# Patient Record
Sex: MALE | NOT HISPANIC OR LATINO | Employment: UNEMPLOYED | URBAN - METROPOLITAN AREA
[De-identification: names, ages, dates, MRNs, and addresses within clinical notes are randomized per-mention and may not be internally consistent; named-entity substitution may affect disease eponyms.]

---

## 2022-07-29 ENCOUNTER — HOSPITAL ENCOUNTER (EMERGENCY)
Facility: HOSPITAL | Age: 62
Discharge: HOME | End: 2022-07-29
Attending: EMERGENCY MEDICINE | Admitting: EMERGENCY MEDICINE

## 2022-07-29 VITALS
TEMPERATURE: 98.4 F | SYSTOLIC BLOOD PRESSURE: 148 MMHG | HEART RATE: 93 BPM | DIASTOLIC BLOOD PRESSURE: 96 MMHG | OXYGEN SATURATION: 100 % | RESPIRATION RATE: 16 BRPM

## 2022-07-29 DIAGNOSIS — R73.9 HYPERGLYCEMIA: ICD-10-CM

## 2022-07-29 DIAGNOSIS — F19.10 POLYSUBSTANCE ABUSE: ICD-10-CM

## 2022-07-29 DIAGNOSIS — T40.601A OPIATE OVERDOSE, ACCIDENTAL OR UNINTENTIONAL, INITIAL ENCOUNTER: Primary | ICD-10-CM

## 2022-07-29 LAB
ALBUMIN SERPL-MCNC: 4.5 G/DL (ref 3.5–5.2)
ALBUMIN/GLOB SERPL: 2.1 G/DL
ALP SERPL-CCNC: 81 U/L (ref 39–117)
ALT SERPL W P-5'-P-CCNC: 26 U/L (ref 1–41)
AMPHET+METHAMPHET UR QL: POSITIVE
ANION GAP SERPL CALCULATED.3IONS-SCNC: 10.3 MMOL/L (ref 5–15)
AST SERPL-CCNC: 27 U/L (ref 1–40)
BARBITURATES UR QL SCN: NEGATIVE
BASOPHILS # BLD AUTO: 0.07 10*3/MM3 (ref 0–0.2)
BASOPHILS NFR BLD AUTO: 0.6 % (ref 0–1.5)
BENZODIAZ UR QL SCN: NEGATIVE
BILIRUB SERPL-MCNC: 0.2 MG/DL (ref 0–1.2)
BILIRUB UR QL STRIP: NEGATIVE
BUN SERPL-MCNC: 23 MG/DL (ref 6–20)
BUN/CREAT SERPL: 21.7 (ref 7–25)
CALCIUM SPEC-SCNC: 8.8 MG/DL (ref 8.6–10.5)
CANNABINOIDS SERPL QL: POSITIVE
CHLORIDE SERPL-SCNC: 105 MMOL/L (ref 98–107)
CLARITY UR: CLEAR
CO2 SERPL-SCNC: 26.7 MMOL/L (ref 22–29)
COCAINE UR QL: NEGATIVE
COLOR UR: YELLOW
CREAT SERPL-MCNC: 1.06 MG/DL (ref 0.76–1.27)
DEPRECATED RDW RBC AUTO: 39.4 FL (ref 37–54)
EGFRCR SERPLBLD CKD-EPI 2021: 80.8 ML/MIN/1.73
EOSINOPHIL # BLD AUTO: 0.16 10*3/MM3 (ref 0–0.4)
EOSINOPHIL NFR BLD AUTO: 1.4 % (ref 0.3–6.2)
ERYTHROCYTE [DISTWIDTH] IN BLOOD BY AUTOMATED COUNT: 12.3 % (ref 12.3–15.4)
GLOBULIN UR ELPH-MCNC: 2.1 GM/DL
GLUCOSE BLDC GLUCOMTR-MCNC: 207 MG/DL (ref 70–130)
GLUCOSE SERPL-MCNC: 225 MG/DL (ref 65–99)
GLUCOSE UR STRIP-MCNC: ABNORMAL MG/DL
HCT VFR BLD AUTO: 41.6 % (ref 37.5–51)
HGB BLD-MCNC: 13.9 G/DL (ref 13–17.7)
HGB UR QL STRIP.AUTO: NEGATIVE
IMM GRANULOCYTES # BLD AUTO: 0.04 10*3/MM3 (ref 0–0.05)
IMM GRANULOCYTES NFR BLD AUTO: 0.3 % (ref 0–0.5)
KETONES UR QL STRIP: NEGATIVE
LEUKOCYTE ESTERASE UR QL STRIP.AUTO: NEGATIVE
LYMPHOCYTES # BLD AUTO: 1.32 10*3/MM3 (ref 0.7–3.1)
LYMPHOCYTES NFR BLD AUTO: 11.4 % (ref 19.6–45.3)
MCH RBC QN AUTO: 29.8 PG (ref 26.6–33)
MCHC RBC AUTO-ENTMCNC: 33.4 G/DL (ref 31.5–35.7)
MCV RBC AUTO: 89.1 FL (ref 79–97)
METHADONE UR QL SCN: NEGATIVE
MONOCYTES # BLD AUTO: 0.55 10*3/MM3 (ref 0.1–0.9)
MONOCYTES NFR BLD AUTO: 4.8 % (ref 5–12)
NEUTROPHILS NFR BLD AUTO: 81.5 % (ref 42.7–76)
NEUTROPHILS NFR BLD AUTO: 9.39 10*3/MM3 (ref 1.7–7)
NITRITE UR QL STRIP: NEGATIVE
NRBC BLD AUTO-RTO: 0 /100 WBC (ref 0–0.2)
OPIATES UR QL: NEGATIVE
OXYCODONE UR QL SCN: NEGATIVE
PH UR STRIP.AUTO: 6.5 [PH] (ref 5–8)
PLATELET # BLD AUTO: 380 10*3/MM3 (ref 140–450)
PMV BLD AUTO: 9.9 FL (ref 6–12)
POTASSIUM SERPL-SCNC: 3.6 MMOL/L (ref 3.5–5.2)
PROT SERPL-MCNC: 6.6 G/DL (ref 6–8.5)
PROT UR QL STRIP: NEGATIVE
QT INTERVAL: 414 MS
RBC # BLD AUTO: 4.67 10*6/MM3 (ref 4.14–5.8)
SODIUM SERPL-SCNC: 142 MMOL/L (ref 136–145)
SP GR UR STRIP: 1.02 (ref 1–1.03)
TROPONIN T SERPL-MCNC: <0.01 NG/ML (ref 0–0.03)
UROBILINOGEN UR QL STRIP: ABNORMAL
WBC NRBC COR # BLD: 11.53 10*3/MM3 (ref 3.4–10.8)

## 2022-07-29 PROCEDURE — 80307 DRUG TEST PRSMV CHEM ANLYZR: CPT | Performed by: NURSE PRACTITIONER

## 2022-07-29 PROCEDURE — 85025 COMPLETE CBC W/AUTO DIFF WBC: CPT | Performed by: NURSE PRACTITIONER

## 2022-07-29 PROCEDURE — 80053 COMPREHEN METABOLIC PANEL: CPT | Performed by: NURSE PRACTITIONER

## 2022-07-29 PROCEDURE — 96374 THER/PROPH/DIAG INJ IV PUSH: CPT

## 2022-07-29 PROCEDURE — 93005 ELECTROCARDIOGRAM TRACING: CPT | Performed by: NURSE PRACTITIONER

## 2022-07-29 PROCEDURE — 99284 EMERGENCY DEPT VISIT MOD MDM: CPT

## 2022-07-29 PROCEDURE — 81003 URINALYSIS AUTO W/O SCOPE: CPT | Performed by: NURSE PRACTITIONER

## 2022-07-29 PROCEDURE — 82962 GLUCOSE BLOOD TEST: CPT

## 2022-07-29 PROCEDURE — 25010000002 NALOXONE PER 1 MG

## 2022-07-29 PROCEDURE — 36415 COLL VENOUS BLD VENIPUNCTURE: CPT

## 2022-07-29 PROCEDURE — 84484 ASSAY OF TROPONIN QUANT: CPT | Performed by: NURSE PRACTITIONER

## 2022-07-29 PROCEDURE — 93010 ELECTROCARDIOGRAM REPORT: CPT | Performed by: INTERNAL MEDICINE

## 2022-07-29 RX ORDER — NALOXONE HYDROCHLORIDE 0.4 MG/ML
INJECTION, SOLUTION INTRAMUSCULAR; INTRAVENOUS; SUBCUTANEOUS
Status: COMPLETED
Start: 2022-07-29 | End: 2022-07-29

## 2022-07-29 RX ORDER — NALOXONE HCL 0.4 MG/ML
0.2 VIAL (ML) INJECTION ONCE
Status: COMPLETED | OUTPATIENT
Start: 2022-07-29 | End: 2022-07-29

## 2022-07-29 RX ADMIN — SODIUM CHLORIDE 1000 ML: 9 INJECTION, SOLUTION INTRAVENOUS at 17:27

## 2022-07-29 RX ADMIN — Medication 0.2 MG: at 17:10

## 2022-07-29 RX ADMIN — NALOXONE HYDROCHLORIDE 0.2 MG: 0.4 INJECTION, SOLUTION INTRAMUSCULAR; INTRAVENOUS; SUBCUTANEOUS at 17:10

## 2023-12-18 ENCOUNTER — APPOINTMENT (OUTPATIENT)
Dept: GENERAL RADIOLOGY | Facility: HOSPITAL | Age: 63
End: 2023-12-18
Payer: MEDICAID

## 2023-12-18 ENCOUNTER — HOSPITAL ENCOUNTER (INPATIENT)
Facility: HOSPITAL | Age: 63
LOS: 7 days | Discharge: HOME | End: 2023-12-26
Attending: EMERGENCY MEDICINE | Admitting: HOSPITALIST
Payer: MEDICAID

## 2023-12-18 DIAGNOSIS — M54.81 OCCIPITAL NEURALGIA, UNSPECIFIED LATERALITY: ICD-10-CM

## 2023-12-18 DIAGNOSIS — D72.829 LEUKOCYTOSIS, UNSPECIFIED TYPE: ICD-10-CM

## 2023-12-18 DIAGNOSIS — M54.2 NECK PAIN: ICD-10-CM

## 2023-12-18 DIAGNOSIS — J18.9 PNEUMONIA DUE TO INFECTIOUS ORGANISM, UNSPECIFIED LATERALITY, UNSPECIFIED PART OF LUNG: Primary | ICD-10-CM

## 2023-12-18 PROCEDURE — 83605 ASSAY OF LACTIC ACID: CPT | Performed by: EMERGENCY MEDICINE

## 2023-12-18 PROCEDURE — 85730 THROMBOPLASTIN TIME PARTIAL: CPT | Performed by: EMERGENCY MEDICINE

## 2023-12-18 PROCEDURE — 36415 COLL VENOUS BLD VENIPUNCTURE: CPT

## 2023-12-18 PROCEDURE — 87077 CULTURE AEROBIC IDENTIFY: CPT | Performed by: EMERGENCY MEDICINE

## 2023-12-18 PROCEDURE — 99285 EMERGENCY DEPT VISIT HI MDM: CPT

## 2023-12-18 PROCEDURE — 85610 PROTHROMBIN TIME: CPT | Performed by: EMERGENCY MEDICINE

## 2023-12-18 PROCEDURE — 25010000002 MORPHINE PER 10 MG: Performed by: EMERGENCY MEDICINE

## 2023-12-18 PROCEDURE — 25010000002 ONDANSETRON PER 1 MG: Performed by: EMERGENCY MEDICINE

## 2023-12-18 PROCEDURE — 85025 COMPLETE CBC W/AUTO DIFF WBC: CPT | Performed by: EMERGENCY MEDICINE

## 2023-12-18 PROCEDURE — 71045 X-RAY EXAM CHEST 1 VIEW: CPT

## 2023-12-18 PROCEDURE — 87186 SC STD MICRODIL/AGAR DIL: CPT | Performed by: EMERGENCY MEDICINE

## 2023-12-18 PROCEDURE — 87040 BLOOD CULTURE FOR BACTERIA: CPT | Performed by: EMERGENCY MEDICINE

## 2023-12-18 PROCEDURE — 84145 PROCALCITONIN (PCT): CPT | Performed by: EMERGENCY MEDICINE

## 2023-12-18 PROCEDURE — 87150 DNA/RNA AMPLIFIED PROBE: CPT | Performed by: EMERGENCY MEDICINE

## 2023-12-18 PROCEDURE — 80053 COMPREHEN METABOLIC PANEL: CPT | Performed by: EMERGENCY MEDICINE

## 2023-12-18 RX ORDER — MORPHINE SULFATE 2 MG/ML
4 INJECTION, SOLUTION INTRAMUSCULAR; INTRAVENOUS ONCE
Status: COMPLETED | OUTPATIENT
Start: 2023-12-19 | End: 2023-12-18

## 2023-12-18 RX ORDER — DEXAMETHASONE SODIUM PHOSPHATE 10 MG/ML
10 INJECTION INTRAMUSCULAR; INTRAVENOUS ONCE
Status: COMPLETED | OUTPATIENT
Start: 2023-12-19 | End: 2023-12-19

## 2023-12-18 RX ORDER — ONDANSETRON 2 MG/ML
4 INJECTION INTRAMUSCULAR; INTRAVENOUS ONCE
Status: COMPLETED | OUTPATIENT
Start: 2023-12-19 | End: 2023-12-18

## 2023-12-18 RX ORDER — SODIUM CHLORIDE 0.9 % (FLUSH) 0.9 %
10 SYRINGE (ML) INJECTION AS NEEDED
Status: DISCONTINUED | OUTPATIENT
Start: 2023-12-18 | End: 2023-12-25

## 2023-12-18 RX ADMIN — MORPHINE SULFATE 4 MG: 2 INJECTION, SOLUTION INTRAMUSCULAR; INTRAVENOUS at 23:58

## 2023-12-18 RX ADMIN — ONDANSETRON 4 MG: 2 INJECTION INTRAMUSCULAR; INTRAVENOUS at 23:58

## 2023-12-19 ENCOUNTER — APPOINTMENT (OUTPATIENT)
Dept: CT IMAGING | Facility: HOSPITAL | Age: 63
End: 2023-12-19
Payer: MEDICAID

## 2023-12-19 ENCOUNTER — APPOINTMENT (OUTPATIENT)
Dept: GENERAL RADIOLOGY | Facility: HOSPITAL | Age: 63
End: 2023-12-19
Payer: MEDICAID

## 2023-12-19 ENCOUNTER — APPOINTMENT (OUTPATIENT)
Dept: MRI IMAGING | Facility: HOSPITAL | Age: 63
End: 2023-12-19
Payer: MEDICAID

## 2023-12-19 PROBLEM — D72.829 LEUKOCYTOSIS: Status: ACTIVE | Noted: 2023-12-19

## 2023-12-19 PROBLEM — J18.9 PNEUMONIA: Status: ACTIVE | Noted: 2023-12-19

## 2023-12-19 PROBLEM — J18.9 PNEUMONIA DUE TO INFECTIOUS ORGANISM: Status: ACTIVE | Noted: 2023-12-19

## 2023-12-19 PROBLEM — M54.2 NECK PAIN: Status: ACTIVE | Noted: 2023-12-19

## 2023-12-19 LAB
ALBUMIN SERPL-MCNC: 3.7 G/DL (ref 3.5–5.2)
ALBUMIN/GLOB SERPL: 1.1 G/DL
ALP SERPL-CCNC: 131 U/L (ref 39–117)
ALT SERPL W P-5'-P-CCNC: 31 U/L (ref 1–41)
AMPHET+METHAMPHET UR QL: POSITIVE
ANION GAP SERPL CALCULATED.3IONS-SCNC: 13.8 MMOL/L (ref 5–15)
ANION GAP SERPL CALCULATED.3IONS-SCNC: 14 MMOL/L (ref 5–15)
APTT PPP: 36.7 SECONDS (ref 22.7–35.4)
AST SERPL-CCNC: 31 U/L (ref 1–40)
B PARAPERT DNA SPEC QL NAA+PROBE: NOT DETECTED
B PERT DNA SPEC QL NAA+PROBE: NOT DETECTED
BACTERIA BLD CULT: ABNORMAL
BARBITURATES UR QL SCN: NEGATIVE
BASOPHILS # BLD AUTO: 0.05 10*3/MM3 (ref 0–0.2)
BASOPHILS # BLD AUTO: 0.08 10*3/MM3 (ref 0–0.2)
BASOPHILS NFR BLD AUTO: 0.3 % (ref 0–1.5)
BASOPHILS NFR BLD AUTO: 0.4 % (ref 0–1.5)
BENZODIAZ UR QL SCN: NEGATIVE
BILIRUB SERPL-MCNC: 0.3 MG/DL (ref 0–1.2)
BOTTLE TYPE: ABNORMAL
BUN SERPL-MCNC: 14 MG/DL (ref 8–23)
BUN SERPL-MCNC: 16 MG/DL (ref 8–23)
BUN/CREAT SERPL: 17.4 (ref 7–25)
BUN/CREAT SERPL: 19.2 (ref 7–25)
C PNEUM DNA NPH QL NAA+NON-PROBE: NOT DETECTED
CALCIUM SPEC-SCNC: 7.9 MG/DL (ref 8.6–10.5)
CALCIUM SPEC-SCNC: 8.9 MG/DL (ref 8.6–10.5)
CANNABINOIDS SERPL QL: NEGATIVE
CHLORIDE SERPL-SCNC: 100 MMOL/L (ref 98–107)
CHLORIDE SERPL-SCNC: 99 MMOL/L (ref 98–107)
CO2 SERPL-SCNC: 22 MMOL/L (ref 22–29)
CO2 SERPL-SCNC: 22.2 MMOL/L (ref 22–29)
COCAINE UR QL: NEGATIVE
CREAT SERPL-MCNC: 0.73 MG/DL (ref 0.76–1.27)
CREAT SERPL-MCNC: 0.92 MG/DL (ref 0.76–1.27)
D-LACTATE SERPL-SCNC: 1.1 MMOL/L (ref 0.5–2)
D-LACTATE SERPL-SCNC: 1.2 MMOL/L (ref 0.5–2)
DEPRECATED RDW RBC AUTO: 35.4 FL (ref 37–54)
DEPRECATED RDW RBC AUTO: 35.7 FL (ref 37–54)
EGFRCR SERPLBLD CKD-EPI 2021: 104.2 ML/MIN/1.73
EGFRCR SERPLBLD CKD-EPI 2021: 95.2 ML/MIN/1.73
EOSINOPHIL # BLD AUTO: 0.01 10*3/MM3 (ref 0–0.4)
EOSINOPHIL # BLD AUTO: 0.13 10*3/MM3 (ref 0–0.4)
EOSINOPHIL NFR BLD AUTO: 0.1 % (ref 0.3–6.2)
EOSINOPHIL NFR BLD AUTO: 0.6 % (ref 0.3–6.2)
ERYTHROCYTE [DISTWIDTH] IN BLOOD BY AUTOMATED COUNT: 11.3 % (ref 12.3–15.4)
ERYTHROCYTE [DISTWIDTH] IN BLOOD BY AUTOMATED COUNT: 11.4 % (ref 12.3–15.4)
FENTANYL UR-MCNC: NEGATIVE NG/ML
FLUAV SUBTYP SPEC NAA+PROBE: NOT DETECTED
FLUBV RNA ISLT QL NAA+PROBE: NOT DETECTED
GLOBULIN UR ELPH-MCNC: 3.3 GM/DL
GLUCOSE SERPL-MCNC: 162 MG/DL (ref 65–99)
GLUCOSE SERPL-MCNC: 204 MG/DL (ref 65–99)
HADV DNA SPEC NAA+PROBE: NOT DETECTED
HCOV 229E RNA SPEC QL NAA+PROBE: NOT DETECTED
HCOV HKU1 RNA SPEC QL NAA+PROBE: NOT DETECTED
HCOV NL63 RNA SPEC QL NAA+PROBE: NOT DETECTED
HCOV OC43 RNA SPEC QL NAA+PROBE: NOT DETECTED
HCT VFR BLD AUTO: 34.1 % (ref 37.5–51)
HCT VFR BLD AUTO: 36.2 % (ref 37.5–51)
HGB BLD-MCNC: 12 G/DL (ref 13–17.7)
HGB BLD-MCNC: 12.1 G/DL (ref 13–17.7)
HMPV RNA NPH QL NAA+NON-PROBE: NOT DETECTED
HPIV1 RNA ISLT QL NAA+PROBE: NOT DETECTED
HPIV2 RNA SPEC QL NAA+PROBE: NOT DETECTED
HPIV3 RNA NPH QL NAA+PROBE: NOT DETECTED
HPIV4 P GENE NPH QL NAA+PROBE: NOT DETECTED
IMM GRANULOCYTES # BLD AUTO: 0.15 10*3/MM3 (ref 0–0.05)
IMM GRANULOCYTES # BLD AUTO: 0.21 10*3/MM3 (ref 0–0.05)
IMM GRANULOCYTES NFR BLD AUTO: 0.7 % (ref 0–0.5)
IMM GRANULOCYTES NFR BLD AUTO: 1.1 % (ref 0–0.5)
INR PPP: 1.16 (ref 0.9–1.1)
INR PPP: 1.16 (ref 0.9–1.1)
L PNEUMO1 AG UR QL IA: NEGATIVE
LYMPHOCYTES # BLD AUTO: 0.39 10*3/MM3 (ref 0.7–3.1)
LYMPHOCYTES # BLD AUTO: 1.57 10*3/MM3 (ref 0.7–3.1)
LYMPHOCYTES NFR BLD AUTO: 2 % (ref 19.6–45.3)
LYMPHOCYTES NFR BLD AUTO: 7.5 % (ref 19.6–45.3)
M PNEUMO IGG SER IA-ACNC: NOT DETECTED
MCH RBC QN AUTO: 28.8 PG (ref 26.6–33)
MCH RBC QN AUTO: 31 PG (ref 26.6–33)
MCHC RBC AUTO-ENTMCNC: 33.1 G/DL (ref 31.5–35.7)
MCHC RBC AUTO-ENTMCNC: 35.5 G/DL (ref 31.5–35.7)
MCV RBC AUTO: 87 FL (ref 79–97)
MCV RBC AUTO: 87.4 FL (ref 79–97)
METHADONE UR QL SCN: NEGATIVE
MONOCYTES # BLD AUTO: 0.59 10*3/MM3 (ref 0.1–0.9)
MONOCYTES # BLD AUTO: 1.52 10*3/MM3 (ref 0.1–0.9)
MONOCYTES NFR BLD AUTO: 3 % (ref 5–12)
MONOCYTES NFR BLD AUTO: 7.3 % (ref 5–12)
NEUTROPHILS NFR BLD AUTO: 17.51 10*3/MM3 (ref 1.7–7)
NEUTROPHILS NFR BLD AUTO: 18.73 10*3/MM3 (ref 1.7–7)
NEUTROPHILS NFR BLD AUTO: 83.5 % (ref 42.7–76)
NEUTROPHILS NFR BLD AUTO: 93.5 % (ref 42.7–76)
NRBC BLD AUTO-RTO: 0 /100 WBC (ref 0–0.2)
NRBC BLD AUTO-RTO: 0 /100 WBC (ref 0–0.2)
OPIATES UR QL: POSITIVE
OXYCODONE UR QL SCN: NEGATIVE
PLATELET # BLD AUTO: 375 10*3/MM3 (ref 140–450)
PLATELET # BLD AUTO: 398 10*3/MM3 (ref 140–450)
PMV BLD AUTO: 10 FL (ref 6–12)
PMV BLD AUTO: 10.3 FL (ref 6–12)
POTASSIUM SERPL-SCNC: 3.5 MMOL/L (ref 3.5–5.2)
POTASSIUM SERPL-SCNC: 3.6 MMOL/L (ref 3.5–5.2)
PROCALCITONIN SERPL-MCNC: 5.45 NG/ML (ref 0–0.25)
PROT SERPL-MCNC: 7 G/DL (ref 6–8.5)
PROTHROMBIN TIME: 14.9 SECONDS (ref 11.7–14.2)
PROTHROMBIN TIME: 15 SECONDS (ref 11.7–14.2)
RBC # BLD AUTO: 3.9 10*6/MM3 (ref 4.14–5.8)
RBC # BLD AUTO: 4.16 10*6/MM3 (ref 4.14–5.8)
RHINOVIRUS RNA SPEC NAA+PROBE: NOT DETECTED
RSV RNA NPH QL NAA+NON-PROBE: NOT DETECTED
S PNEUM AG SPEC QL LA: POSITIVE
SARS-COV-2 RNA NPH QL NAA+NON-PROBE: NOT DETECTED
SODIUM SERPL-SCNC: 135 MMOL/L (ref 136–145)
SODIUM SERPL-SCNC: 136 MMOL/L (ref 136–145)
WBC NRBC COR # BLD AUTO: 19.98 10*3/MM3 (ref 3.4–10.8)
WBC NRBC COR # BLD AUTO: 20.96 10*3/MM3 (ref 3.4–10.8)

## 2023-12-19 PROCEDURE — 25010000002 DEXAMETHASONE PER 1 MG: Performed by: EMERGENCY MEDICINE

## 2023-12-19 PROCEDURE — 25010000002 CEFTRIAXONE PER 250 MG: Performed by: HOSPITALIST

## 2023-12-19 PROCEDURE — 70450 CT HEAD/BRAIN W/O DYE: CPT

## 2023-12-19 PROCEDURE — 80048 BASIC METABOLIC PNL TOTAL CA: CPT | Performed by: NURSE PRACTITIONER

## 2023-12-19 PROCEDURE — 83605 ASSAY OF LACTIC ACID: CPT | Performed by: NURSE PRACTITIONER

## 2023-12-19 PROCEDURE — 80307 DRUG TEST PRSMV CHEM ANLYZR: CPT | Performed by: EMERGENCY MEDICINE

## 2023-12-19 PROCEDURE — 85025 COMPLETE CBC W/AUTO DIFF WBC: CPT | Performed by: NURSE PRACTITIONER

## 2023-12-19 PROCEDURE — A9577 INJ MULTIHANCE: HCPCS | Performed by: HOSPITALIST

## 2023-12-19 PROCEDURE — 87040 BLOOD CULTURE FOR BACTERIA: CPT | Performed by: EMERGENCY MEDICINE

## 2023-12-19 PROCEDURE — 72156 MRI NECK SPINE W/O & W/DYE: CPT

## 2023-12-19 PROCEDURE — 25810000003 SODIUM CHLORIDE 0.9 % SOLUTION: Performed by: NURSE PRACTITIONER

## 2023-12-19 PROCEDURE — 87070 CULTURE OTHR SPECIMN AEROBIC: CPT | Performed by: NURSE PRACTITIONER

## 2023-12-19 PROCEDURE — 25810000003 SODIUM CHLORIDE 0.9 % SOLUTION: Performed by: EMERGENCY MEDICINE

## 2023-12-19 PROCEDURE — 0202U NFCT DS 22 TRGT SARS-COV-2: CPT | Performed by: HOSPITALIST

## 2023-12-19 PROCEDURE — 25810000003 SODIUM CHLORIDE 0.9 % SOLUTION 250 ML FLEX CONT: Performed by: EMERGENCY MEDICINE

## 2023-12-19 PROCEDURE — 87449 NOS EACH ORGANISM AG IA: CPT | Performed by: HOSPITALIST

## 2023-12-19 PROCEDURE — 99223 1ST HOSP IP/OBS HIGH 75: CPT | Performed by: INTERNAL MEDICINE

## 2023-12-19 PROCEDURE — 0 GADOBENATE DIMEGLUMINE 529 MG/ML SOLUTION: Performed by: HOSPITALIST

## 2023-12-19 PROCEDURE — 25010000002 HYDROMORPHONE PER 4 MG: Performed by: NURSE PRACTITIONER

## 2023-12-19 PROCEDURE — 72125 CT NECK SPINE W/O DYE: CPT

## 2023-12-19 PROCEDURE — 25010000002 HYDROMORPHONE 1 MG/ML SOLUTION: Performed by: EMERGENCY MEDICINE

## 2023-12-19 PROCEDURE — 25010000002 AZITHROMYCIN PER 500 MG: Performed by: EMERGENCY MEDICINE

## 2023-12-19 PROCEDURE — 87077 CULTURE AEROBIC IDENTIFY: CPT | Performed by: EMERGENCY MEDICINE

## 2023-12-19 PROCEDURE — 87205 SMEAR GRAM STAIN: CPT | Performed by: NURSE PRACTITIONER

## 2023-12-19 PROCEDURE — 25010000002 CEFTRIAXONE PER 250 MG: Performed by: EMERGENCY MEDICINE

## 2023-12-19 PROCEDURE — 85610 PROTHROMBIN TIME: CPT | Performed by: NURSE PRACTITIONER

## 2023-12-19 PROCEDURE — 36415 COLL VENOUS BLD VENIPUNCTURE: CPT | Performed by: NURSE PRACTITIONER

## 2023-12-19 RX ORDER — ACETAMINOPHEN 650 MG/1
650 SUPPOSITORY RECTAL EVERY 4 HOURS PRN
Status: DISCONTINUED | OUTPATIENT
Start: 2023-12-19 | End: 2023-12-25

## 2023-12-19 RX ORDER — OXYCODONE HYDROCHLORIDE AND ACETAMINOPHEN 5; 325 MG/1; MG/1
1 TABLET ORAL EVERY 4 HOURS PRN
Status: DISCONTINUED | OUTPATIENT
Start: 2023-12-19 | End: 2023-12-20

## 2023-12-19 RX ORDER — CYCLOBENZAPRINE HCL 10 MG
10 TABLET ORAL 3 TIMES DAILY PRN
Status: DISCONTINUED | OUTPATIENT
Start: 2023-12-19 | End: 2023-12-25

## 2023-12-19 RX ORDER — SODIUM CHLORIDE 9 MG/ML
40 INJECTION, SOLUTION INTRAVENOUS AS NEEDED
Status: DISCONTINUED | OUTPATIENT
Start: 2023-12-19 | End: 2023-12-26 | Stop reason: HOSPADM

## 2023-12-19 RX ORDER — BISACODYL 10 MG
10 SUPPOSITORY, RECTAL RECTAL DAILY PRN
Status: DISCONTINUED | OUTPATIENT
Start: 2023-12-19 | End: 2023-12-26 | Stop reason: HOSPADM

## 2023-12-19 RX ORDER — HYDROMORPHONE HYDROCHLORIDE 1 MG/ML
0.5 INJECTION, SOLUTION INTRAMUSCULAR; INTRAVENOUS; SUBCUTANEOUS EVERY 4 HOURS PRN
Status: DISCONTINUED | OUTPATIENT
Start: 2023-12-19 | End: 2023-12-25

## 2023-12-19 RX ORDER — AMOXICILLIN 250 MG
2 CAPSULE ORAL 2 TIMES DAILY
Status: DISCONTINUED | OUTPATIENT
Start: 2023-12-19 | End: 2023-12-26 | Stop reason: HOSPADM

## 2023-12-19 RX ORDER — SODIUM CHLORIDE 9 MG/ML
100 INJECTION, SOLUTION INTRAVENOUS CONTINUOUS
Status: DISCONTINUED | OUTPATIENT
Start: 2023-12-19 | End: 2023-12-23

## 2023-12-19 RX ORDER — ACETAMINOPHEN 325 MG/1
650 TABLET ORAL EVERY 4 HOURS PRN
Status: DISCONTINUED | OUTPATIENT
Start: 2023-12-19 | End: 2023-12-26 | Stop reason: HOSPADM

## 2023-12-19 RX ORDER — SODIUM CHLORIDE 0.9 % (FLUSH) 0.9 %
10 SYRINGE (ML) INJECTION AS NEEDED
Status: DISCONTINUED | OUTPATIENT
Start: 2023-12-19 | End: 2023-12-26 | Stop reason: HOSPADM

## 2023-12-19 RX ORDER — POLYETHYLENE GLYCOL 3350 17 G/17G
17 POWDER, FOR SOLUTION ORAL DAILY PRN
Status: DISCONTINUED | OUTPATIENT
Start: 2023-12-19 | End: 2023-12-26 | Stop reason: HOSPADM

## 2023-12-19 RX ORDER — BISACODYL 5 MG/1
5 TABLET, DELAYED RELEASE ORAL DAILY PRN
Status: DISCONTINUED | OUTPATIENT
Start: 2023-12-19 | End: 2023-12-26 | Stop reason: HOSPADM

## 2023-12-19 RX ORDER — SODIUM CHLORIDE 0.9 % (FLUSH) 0.9 %
10 SYRINGE (ML) INJECTION EVERY 12 HOURS SCHEDULED
Status: DISCONTINUED | OUTPATIENT
Start: 2023-12-19 | End: 2023-12-26 | Stop reason: HOSPADM

## 2023-12-19 RX ORDER — NITROGLYCERIN 0.4 MG/1
0.4 TABLET SUBLINGUAL
Status: DISCONTINUED | OUTPATIENT
Start: 2023-12-19 | End: 2023-12-26 | Stop reason: HOSPADM

## 2023-12-19 RX ORDER — ACETAMINOPHEN 160 MG/5ML
650 SOLUTION ORAL EVERY 4 HOURS PRN
Status: DISCONTINUED | OUTPATIENT
Start: 2023-12-19 | End: 2023-12-25

## 2023-12-19 RX ORDER — ONDANSETRON 2 MG/ML
4 INJECTION INTRAMUSCULAR; INTRAVENOUS EVERY 6 HOURS PRN
Status: DISCONTINUED | OUTPATIENT
Start: 2023-12-19 | End: 2023-12-26 | Stop reason: HOSPADM

## 2023-12-19 RX ADMIN — HYDROMORPHONE HYDROCHLORIDE 1 MG: 1 INJECTION, SOLUTION INTRAMUSCULAR; INTRAVENOUS; SUBCUTANEOUS at 00:26

## 2023-12-19 RX ADMIN — OXYCODONE AND ACETAMINOPHEN 1 TABLET: 5; 325 TABLET ORAL at 21:59

## 2023-12-19 RX ADMIN — DEXAMETHASONE SODIUM PHOSPHATE 10 MG: 10 INJECTION INTRAMUSCULAR; INTRAVENOUS at 00:06

## 2023-12-19 RX ADMIN — AZITHROMYCIN MONOHYDRATE 500 MG: 500 INJECTION, POWDER, LYOPHILIZED, FOR SOLUTION INTRAVENOUS at 01:02

## 2023-12-19 RX ADMIN — SODIUM CHLORIDE 100 ML/HR: 9 INJECTION, SOLUTION INTRAVENOUS at 14:54

## 2023-12-19 RX ADMIN — HYDROMORPHONE HYDROCHLORIDE 0.5 MG: 1 INJECTION, SOLUTION INTRAMUSCULAR; INTRAVENOUS; SUBCUTANEOUS at 05:08

## 2023-12-19 RX ADMIN — CEFTRIAXONE SODIUM 1000 MG: 1 INJECTION, POWDER, FOR SOLUTION INTRAMUSCULAR; INTRAVENOUS at 00:12

## 2023-12-19 RX ADMIN — HYDROMORPHONE HYDROCHLORIDE 0.5 MG: 1 INJECTION, SOLUTION INTRAMUSCULAR; INTRAVENOUS; SUBCUTANEOUS at 18:21

## 2023-12-19 RX ADMIN — SODIUM CHLORIDE 100 ML/HR: 9 INJECTION, SOLUTION INTRAVENOUS at 04:38

## 2023-12-19 RX ADMIN — HYDROMORPHONE HYDROCHLORIDE 0.5 MG: 1 INJECTION, SOLUTION INTRAMUSCULAR; INTRAVENOUS; SUBCUTANEOUS at 09:57

## 2023-12-19 RX ADMIN — Medication 10 ML: at 22:01

## 2023-12-19 RX ADMIN — Medication 10 ML: at 08:00

## 2023-12-19 RX ADMIN — HYDROMORPHONE HYDROCHLORIDE 1 MG: 1 INJECTION, SOLUTION INTRAMUSCULAR; INTRAVENOUS; SUBCUTANEOUS at 01:46

## 2023-12-19 RX ADMIN — OXYCODONE AND ACETAMINOPHEN 1 TABLET: 5; 325 TABLET ORAL at 14:44

## 2023-12-19 RX ADMIN — GADOBENATE DIMEGLUMINE 14 ML: 529 INJECTION, SOLUTION INTRAVENOUS at 20:26

## 2023-12-19 RX ADMIN — SENNOSIDES AND DOCUSATE SODIUM 2 TABLET: 50; 8.6 TABLET ORAL at 08:20

## 2023-12-19 RX ADMIN — CEFTRIAXONE 2000 MG: 2 INJECTION, POWDER, FOR SOLUTION INTRAMUSCULAR; INTRAVENOUS at 22:00

## 2023-12-19 RX ADMIN — SODIUM CHLORIDE 1000 ML: 9 INJECTION, SOLUTION INTRAVENOUS at 01:02

## 2023-12-19 RX ADMIN — ACETAMINOPHEN 650 MG: 325 TABLET, FILM COATED ORAL at 08:20

## 2023-12-19 RX ADMIN — CYCLOBENZAPRINE 10 MG: 10 TABLET, FILM COATED ORAL at 16:02

## 2023-12-19 NOTE — PLAN OF CARE
Goal Outcome Evaluation:         Pt. Arrived to this unit around 0430 per w/c, Pt. Transferred to bed self from w/c, alert, oriented x4, admitted with pneumonia with neck pain, no coughing noted, no congested to bilateral lungs area, Pt. C/o pain so bad to neck area, [facility] called, new order noted, pain injection done as ordered around 0500, v/s stable, 02 sats 94% with 02 inhal. @2l/m per n/c, no s/s acute distress noted

## 2023-12-19 NOTE — NURSING NOTE
This patient was rude and disgruntle at triage, throwing ICC papers at this nurse twice. Pt was asked to not throw papers at anyone and remain calm to answer appropriate triage questions. Pt was in NAD.

## 2023-12-19 NOTE — PROGRESS NOTES
Referring Provider: Richard Perez MD      Subjective   History of present illness: 62-year-old with approximately 5-day history of chest congestion, pleuritic chest pain, intermittently productive cough and fever and shaking chills.  Eventually went to an urgent care center and was diagnosed with pneumonia.  He had been sleeping awkwardly and thinks he may have injured the neck.  There was concerns for potential meningitis so he is referred to the ER.  Here he was found to be tachycardic and had a white count of 21 with a procalcitonin of 5.45.  He was started on ceftriaxone and admitted.  Blood cultures now growing gram-positive cocci  UDS was also positive for opiates and amphetamines/methamphetamines.  He denies any illicit drug use.  He denies any alcohol use.  He works as a  and lives with a friend in [facility]  Physical Exam:   Vital Signs   Temp:  [97.1 °F (36.2 °C)-98.4 °F (36.9 °C)] 98 °F (36.7 °C)  Heart Rate:  [] 88  Resp:  [16-18] 16  BP: (132-175)/() 144/105    GENERAL: Awake and alert, in no acute distress.   HEENT: Oropharynx is clear. Hearing is grossly normal.   EYES: . No conjunctival injection. No lid lag.   LUNGS:normal respiratory effort.   SKIN: no cutaneous eruptions in exposed areas  PSYCHIATRIC: Appropriate mood, affect, insight, and judgment.     Results Review:  WBC 19.98, hgb 12, plt 398  Cr 0.73  PCT 5.45  LFTs nl  UDS +amp/meth, opaites      Microbiology:  12/18 Bcx 2/2 GPC in pairs  12/19 RPP neg     Radiology: CXR, independently interpreted: LLL PNA    A/p  1.  Gram-positive septicemia secondary #2  2.  Left lower lobe pneumonia    Suspect the neck pain is just musculoskeletal and has a severe bacteremic left lower lobe pneumonia (probably pneumococcus).  Agree with Rocephin 2 g IV every 24 hours.  Will repeat blood cultures tomorrow      Thank you for this consult.  We will continue to follow along and tailor antibiotics as the patient's clinical course  evolves.

## 2023-12-19 NOTE — H&P
HISTORY AND PHYSICAL   [facility]        Patient Identification:  Name: Markos Virk  Age: 62 y.o.  Sex: male  :  1960  MRN: [id number]                     Primary Care Physician: Provider, No Known    Chief Complaint: Neck pain, cough and congestion    History of Present Illness:      The patient is a 62 y.o. male who presents to the hospital complaining of acute cough and congestion for about a week.  Patient also reports neck pain and stiffness for the past 48 hours.  No confusion.  Intermittent fever and chills.  Patient was seen in urgent care earlier today told he had pneumonia in his left lung.  Patient was advised to come to the ER for further evaluation possible meningitis.  Patient reports he has headache and neck stiffness unable to rotate flex or extend his neck without discomfort.  States not had similar pain in the past.    Past Medical History:  History reviewed. No pertinent past medical history.  Past Surgical History:  History reviewed. No pertinent surgical history.   Home Meds:  No medications prior to admission.     Current meds    Current Facility-Administered Medications:     acetaminophen (TYLENOL) tablet 650 mg, 650 mg, Oral, Q4H PRN, 650 mg at 23 0820 **OR** acetaminophen (TYLENOL) 160 MG/5ML oral solution 650 mg, 650 mg, Oral, Q4H PRN **OR** acetaminophen (TYLENOL) suppository 650 mg, 650 mg, Rectal, Q4H PRN, Samina Cornejo APRN    sennosides-docusate (PERICOLACE) 8.6-50 MG per tablet 2 tablet, 2 tablet, Oral, BID, 2 tablet at 23 0820 **AND** polyethylene glycol (MIRALAX) packet 17 g, 17 g, Oral, Daily PRN **AND** bisacodyl (DULCOLAX) EC tablet 5 mg, 5 mg, Oral, Daily PRN **AND** bisacodyl (DULCOLAX) suppository 10 mg, 10 mg, Rectal, Daily PRN, Samina Cornejo APRN    cefTRIAXone (ROCEPHIN) 1,000 mg in sodium chloride 0.9 % 100 mL IVPB-VTB, 1,000 mg, Intravenous, Q24H, Samina Cornejo APRN    HYDROmorphone (DILAUDID) injection 0.5 mg,  0.5 mg, Intravenous, Q4H PRN, Clemente, Samina M, APRN, 0.5 mg at 12/19/23 0957    nitroglycerin (NITROSTAT) SL tablet 0.4 mg, 0.4 mg, Sublingual, Q5 Min PRN, Clemente, Samina M, APRN    ondansetron (ZOFRAN) injection 4 mg, 4 mg, Intravenous, Q6H PRN, Clemente, Samina M, APRN    [COMPLETED] Insert Peripheral IV, , , Once **AND** sodium chloride 0.9 % flush 10 mL, 10 mL, Intravenous, PRN, Dioni Guardado MD    sodium chloride 0.9 % flush 10 mL, 10 mL, Intravenous, Q12H, Clemente, Samina M, APRN    sodium chloride 0.9 % flush 10 mL, 10 mL, Intravenous, PRN, Clemente, Samina M, APRN    sodium chloride 0.9 % infusion 40 mL, 40 mL, Intravenous, PRN, Clemente, Samina M, APRN    sodium chloride 0.9 % infusion, 100 mL/hr, Intravenous, Continuous, Clemente, Samina M, APRN, Last Rate: 100 mL/hr at 12/19/23 0438, 100 mL/hr at 12/19/23 0438  Allergies:  No Known Allergies  Immunizations:    There is no immunization history on file for this patient.  Social History:   Social History     Social History Narrative    Not on file     Social History     Socioeconomic History    Marital status: Single   Tobacco Use    Smoking status: Former     Types: Cigarettes   Vaping Use    Vaping Use: Never used   Substance and Sexual Activity    Drug use: Not Currently       Family History:  History reviewed. No pertinent family history.     Review of Systems  See history of present illness and past medical history.  Patient denies headache, dizziness, syncope, falls, trauma, change in vision, change in hearing, change in taste, changes in weight, changes in appetite, focal weakness, numbness, or paresthesia.  Patient denies chest pain, palpitations, dyspnea, orthopnea, PND, cough, sinus pressure, rhinorrhea, epistaxis, hemoptysis, nausea, vomiting, hematemesis, diarrhea, constipation or hematochezia. Denies cold or heat intolerance, polydipsia, polyuria, polyphagia. Denies hematuria, pyuria, dysuria, hesitancy, frequency or  "urgency. Denies consumption of raw and under cooked meats foods or change in water source.    Denies missing any routine medications. Remainder of ROS is negative.    Objective:  tMax 24 hrs: Temp (24hrs), Av.8 °F (36.6 °C), Min:97.1 °F (36.2 °C), Max:98.4 °F (36.9 °C)    Vitals Ranges:   Temp:  [97.1 °F (36.2 °C)-98.4 °F (36.9 °C)] 98.4 °F (36.9 °C)  Heart Rate:  [] 77  Resp:  [16-18] 16  BP: (132-175)/() 142/87      Exam:  /87 (BP Location: Right arm, Patient Position: Lying)   Pulse 77   Temp 98.4 °F (36.9 °C) (Oral)   Resp 16   Ht 172.7 cm (68\")   Wt 69.4 kg (153 lb)   SpO2 95%   BMI 23.26 kg/m²     General Appearance:    Alert, cooperative, no distress, appears stated age   Head:    Normocephalic, without obvious abnormality, atraumatic   Eyes:    PERRL, conjunctivae/corneas clear, EOM's intact, both eyes   Ears:    Normal external ear canals, both ears   Nose:   Nares normal, septum midline, mucosa normal, no drainage    or sinus tenderness   Throat:   Lips, mucosa, and tongue normal   Neck: Diffusely tender, symmetrical, trachea midline, no adenopathy;     thyroid:  no enlargement/tenderness/nodules; no carotid    bruit or JVD   Back:     Symmetric, no curvature, ROM normal, no CVA tenderness   Lungs:   Some crackles on the left bilaterally, respirations unlabored   Chest Wall:    No tenderness or deformity    Heart:    Regular rate and rhythm, S1 and S2 normal, no murmur, rub   or gallop   Abdomen:     Soft, nontender, bowel sounds active all four quadrants,     no masses, no hepatomegaly, no splenomegaly   Extremities:   Extremities normal, atraumatic, no cyanosis or edema   Pulses:   2+ and symmetric all extremities   Skin:   Skin color, texture, turgor normal, no rashes or lesions   Lymph nodes:   Cervical, supraclavicular, and axillary nodes normal   Neurologic:   CNII-XII intact, normal strength, sensation intact throughout      .    Data Review:  Lab Results (last 72 hours) "       Procedure Component Value Units Date/Time    Respiratory Culture - Sputum, Cough [[id number]] Collected: 12/19/23 0945    Specimen: Sputum from Cough Updated: 12/19/23 1017    Lactic Acid, Plasma [[id number]]  (Normal) Collected: 12/19/23 0457    Specimen: Blood Updated: 12/19/23 0624     Lactate 1.2 mmol/L     Basic Metabolic Panel [[id number]]  (Abnormal) Collected: 12/19/23 0457    Specimen: Blood Updated: 12/19/23 0624     Glucose 204 mg/dL      BUN 14 mg/dL      Creatinine 0.73 mg/dL      Sodium 136 mmol/L      Potassium 3.6 mmol/L      Chloride 100 mmol/L      CO2 22.2 mmol/L      Calcium 7.9 mg/dL      BUN/Creatinine Ratio 19.2     Anion Gap 13.8 mmol/L      eGFR 104.2 mL/min/1.73     Narrative:      GFR Normal >60  Chronic Kidney Disease <60  Kidney Failure <15      Protime-INR [[id number]]  (Abnormal) Collected: 12/19/23 0456    Specimen: Blood Updated: 12/19/23 0618     Protime 14.9 Seconds      INR 1.16    CBC Auto Differential [[id number]]  (Abnormal) Collected: 12/19/23 0457    Specimen: Blood Updated: 12/19/23 0608     WBC 19.98 10*3/mm3      RBC 4.16 10*6/mm3      Hemoglobin 12.0 g/dL      Hematocrit 36.2 %      MCV 87.0 fL      MCH 28.8 pg      MCHC 33.1 g/dL      RDW 11.3 %      RDW-SD 35.4 fl      MPV 10.3 fL      Platelets 398 10*3/mm3      Neutrophil % 93.5 %      Lymphocyte % 2.0 %      Monocyte % 3.0 %      Eosinophil % 0.1 %      Basophil % 0.3 %      Immature Grans % 1.1 %      Neutrophils, Absolute 18.73 10*3/mm3      Lymphocytes, Absolute 0.39 10*3/mm3      Monocytes, Absolute 0.59 10*3/mm3      Eosinophils, Absolute 0.01 10*3/mm3      Basophils, Absolute 0.05 10*3/mm3      Immature Grans, Absolute 0.21 10*3/mm3      nRBC 0.0 /100 WBC     Urine Drug Screen - Urine, Clean Catch [[id number]]  (Abnormal) Collected: 12/19/23 0409    Specimen: Urine, Clean Catch Updated: 12/19/23 0508     Amphet/Methamphet, Screen Positive     Barbiturates Screen, Urine Negative     Benzodiazepine Screen, Urine  Negative     Cocaine Screen, Urine Negative     Opiate Screen Positive     THC, Screen, Urine Negative     Methadone Screen, Urine Negative     Oxycodone Screen, Urine Negative     Fentanyl, Urine Negative    Narrative:      Negative Thresholds Per Drugs Screened:    Amphetamines                 500 ng/ml  Barbiturates                 200 ng/ml  Benzodiazepines              100 ng/ml  Cocaine                      300 ng/ml  Methadone                    300 ng/ml  Opiates                      300 ng/ml  Oxycodone                    100 ng/ml  THC                           50 ng/ml  Fentanyl                       5 ng/ml      The Normal Value for all drugs tested is negative. This report includes final unconfirmed screening results to be used for medical treatment purposes only. Unconfirmed results must not be used for non-medical purposes such as employment or legal testing. Clinical consideration should be applied to any drug of abuse test, particularly when unconfirmed results are used.            Respiratory Panel PCR w/COVID-19(SARS-CoV-2) GINNY/ROLY/JOSE ANTONIO/PAD/COR/JEFFREY In-House, NP Swab in UTM/VTM, 2 HR TAT - Swab, Nasopharynx [[id number]]  (Normal) Collected: 12/19/23 0001    Specimen: Swab from Nasopharynx Updated: 12/19/23 0117     ADENOVIRUS, PCR Not Detected     Coronavirus 229E Not Detected     Coronavirus HKU1 Not Detected     Coronavirus NL63 Not Detected     Coronavirus OC43 Not Detected     COVID19 Not Detected     Human Metapneumovirus Not Detected     Human Rhinovirus/Enterovirus Not Detected     Influenza A PCR Not Detected     Influenza B PCR Not Detected     Parainfluenza Virus 1 Not Detected     Parainfluenza Virus 2 Not Detected     Parainfluenza Virus 3 Not Detected     Parainfluenza Virus 4 Not Detected     RSV, PCR Not Detected     Bordetella pertussis pcr Not Detected     Bordetella parapertussis PCR Not Detected     Chlamydophila pneumoniae PCR Not Detected     Mycoplasma pneumo by PCR Not Detected     Narrative:      In the setting of a positive respiratory panel with a viral infection PLUS a negative procalcitonin without other underlying concern for bacterial infection, consider observing off antibiotics or discontinuation of antibiotics and continue supportive care. If the respiratory panel is positive for atypical bacterial infection (Bordetella pertussis, Chlamydophila pneumoniae, or Mycoplasma pneumoniae), consider antibiotic de-escalation to target atypical bacterial infection.    Comprehensive Metabolic Panel [[id number]]  (Abnormal) Collected: 12/18/23 2356    Specimen: Blood Updated: 12/19/23 0100     Glucose 162 mg/dL      BUN 16 mg/dL      Creatinine 0.92 mg/dL      Sodium 135 mmol/L      Potassium 3.5 mmol/L      Chloride 99 mmol/L      CO2 22.0 mmol/L      Calcium 8.9 mg/dL      Total Protein 7.0 g/dL      Albumin 3.7 g/dL      ALT (SGPT) 31 U/L      AST (SGOT) 31 U/L      Alkaline Phosphatase 131 U/L      Total Bilirubin 0.3 mg/dL      Globulin 3.3 gm/dL      A/G Ratio 1.1 g/dL      BUN/Creatinine Ratio 17.4     Anion Gap 14.0 mmol/L      eGFR 95.2 mL/min/1.73     Narrative:      GFR Normal >60  Chronic Kidney Disease <60  Kidney Failure <15      Lactic Acid, Plasma [[id number]]  (Normal) Collected: 12/18/23 2356    Specimen: Blood Updated: 12/19/23 0057     Lactate 1.1 mmol/L     Procalcitonin [[id number]]  (Abnormal) Collected: 12/18/23 2356    Specimen: Blood Updated: 12/19/23 0039     Procalcitonin 5.45 ng/mL     Narrative:      As a Marker for Sepsis (Non-Neonates):    1. <0.5 ng/mL represents a low risk of severe sepsis and/or septic shock.  2. >2 ng/mL represents a high risk of severe sepsis and/or septic shock.    As a Marker for Lower Respiratory Tract Infections that require antibiotic therapy:    PCT on Admission    Antibiotic Therapy       6-12 Hrs later    >0.5                Strongly Recommended  >0.25 - <0.5        Recommended   0.1 - 0.25          Discouraged               "Remeasure/reassess PCT  <0.1                Strongly Discouraged     Remeasure/reassess PCT    As 28 day mortality risk marker: \"Change in Procalcitonin Result\" (>80% or <=80%) if Day 0 (or Day 1) and Day 4 values are available. Refer to http://[email or web address]-pct-calculator.com    Change in PCT <=80%  A decrease of PCT levels below or equal to 80% defines a positive change in PCT test result representing a higher risk for 28-day all-cause mortality of patients diagnosed with severe sepsis for septic shock.    Change in PCT >80%  A decrease of PCT levels of more than 80% defines a negative change in PCT result representing a lower risk for 28-day all-cause mortality of patients diagnosed with severe sepsis or septic shock.       Protime-INR [[id number]]  (Abnormal) Collected: 12/18/23 2356    Specimen: Blood Updated: 12/19/23 0022     Protime 15.0 Seconds      INR 1.16    aPTT [[id number]]  (Abnormal) Collected: 12/18/23 2356    Specimen: Blood Updated: 12/19/23 0022     PTT 36.7 seconds     Blood Culture - Blood, Arm, Left [[id number]] Collected: 12/19/23 0011    Specimen: Blood from Arm, Left Updated: 12/19/23 0015    CBC & Differential [[id number]]  (Abnormal) Collected: 12/18/23 2356    Specimen: Blood Updated: 12/19/23 0013    Narrative:      The following orders were created for panel order CBC & Differential.  Procedure                               Abnormality         Status                     ---------                               -----------         ------                     CBC Auto Differential[[id number]]        Abnormal            Final result                 Please view results for these tests on the individual orders.    CBC Auto Differential [[id number]]  (Abnormal) Collected: 12/18/23 2356    Specimen: Blood Updated: 12/19/23 0013     WBC 20.96 10*3/mm3      RBC 3.90 10*6/mm3      Hemoglobin 12.1 g/dL      Hematocrit 34.1 %      MCV 87.4 fL      MCH 31.0 pg      MCHC 35.5 g/dL      RDW 11.4 %      RDW-SD " 35.7 fl      MPV 10.0 fL      Platelets 375 10*3/mm3      Neutrophil % 83.5 %      Lymphocyte % 7.5 %      Monocyte % 7.3 %      Eosinophil % 0.6 %      Basophil % 0.4 %      Immature Grans % 0.7 %      Neutrophils, Absolute 17.51 10*3/mm3      Lymphocytes, Absolute 1.57 10*3/mm3      Monocytes, Absolute 1.52 10*3/mm3      Eosinophils, Absolute 0.13 10*3/mm3      Basophils, Absolute 0.08 10*3/mm3      Immature Grans, Absolute 0.15 10*3/mm3      nRBC 0.0 /100 WBC     Blood Culture - Blood, Arm, Right [[id number]] Collected: 12/18/23 2359    Specimen: Blood from Arm, Right Updated: 12/19/23 0006                     Imaging Results (All)       Procedure Component Value Units Date/Time    CT Cervical Spine Without Contrast [[id number]] Collected: 12/19/23 0108     Updated: 12/19/23 0108    Narrative:        Patient: JORGE BENTLEY  Time Out: 01:07  Exam(s): CT C SPINE     EXAM:    CT Cervical Spine Without Intravenous Contrast    CLINICAL HISTORY:     Reason for exam: neck pain.    TECHNIQUE:    Axial computed tomography images of the cervical spine without   intravenous contrast.  This CT exam was performed according to the   principle of ALARA (As Low As Reasonably Achievable) by using one or more   of the following dose reduction techniques: automated exposure control,   adjustment of the mA and or kV according to patient size, and or use of   iterative reconstruction technique.    COMPARISON:    No relevant prior studies available.    FINDINGS:   The vertebral body heights are maintained. The craniocervical junction is   intact. The atlanto-dens interval is maintained. The dens is intact.    Grade 1 anterolisthesis of C3 on C4, measures 3 mm.    Multilevel cervical spondylosis and degenerative disc disease.  Reversal   of the cervical lordosis.     The unenhanced neck soft tissues are grossly unremarkable.  The   visualized lung apices are grossly clear.    IMPRESSION:  No cervical spine fracture.    Grade 1  anterolisthesis of C3 on C4, measures 3 mm.      Impression:          Electronically signed by Johnathon Shelton MD on 12-19-23 at 0107    CT Head Without Contrast [[id number]] Collected: 12/19/23 0106     Updated: 12/19/23 0106    Narrative:        Patient: JORGE BENTLEY  Time Out: 01:05  Exam(s): CT HEAD Without Contrast     EXAM:    CT Head Without Intravenous Contrast    CLINICAL HISTORY:     Reason for exam: ha.    TECHNIQUE:    Axial computed tomography images of the head brain without intravenous   contrast.  This CT exam was performed according to the principle of ALARA   (As Low As Reasonably Achievable) by using one or more of the following   dose reduction techniques: automated exposure control, adjustment of the   mA and or kV according to patient size, and or use of iterative   reconstruction technique.    COMPARISON:    No relevant prior studies available.    FINDINGS:  No acute intracranial hemorrhage.  No midline shift or mass effect.     The territorial gray-white matter differentiation is maintained   throughout.     Age-related cerebral volume loss.  Periventricular and subcortical white   matter hypoattenuation, consistent with chronic microangiopathy.     The visualized orbits appear grossly unremarkable.    The calvarium is intact.    The visualized paranasal sinuses and mastoid air cells are grossly clear.    IMPRESSION:  No acute intracranial hemorrhage, midline shift, or mass effect.      Impression:          Electronically signed by Johnathon Shelton MD on 12-19-23 at 0105    XR Chest 1 View [[id number]] Collected: 12/19/23 0058     Updated: 12/19/23 0058    Narrative:        Patient: JORGE BENTLEY  Time Out: 00:57  Exam(s): XR CXR 1 VIEW     EXAM:    XR Chest, 1 View    CLINICAL HISTORY:     Reason for exam: cough, fever.    TECHNIQUE:    Frontal view of the chest.    COMPARISON:    No relevant prior studies available.    FINDINGS:    Lungs:  Patchy consolidation in the left mid and lower lung field,    concerning for pneumonia.    Pleural space:  Unremarkable.  No pneumothorax.    Heart:  Cardiomegaly.    Mediastinum:  Unremarkable.  Normal mediastinal contour.    Bones joints:  Right shoulder ORIF.  No acute fracture.    IMPRESSION:         Patchy consolidation in the left mid and lower lung field, concerning   for pneumonia.      Impression:          Electronically signed by Johnathon Shelton MD on 12-19-23 at 0057          History reviewed. No pertinent past medical history.    Assessment:  Active Hospital Problems    Diagnosis  POA    **Pneumonia [J18.9]  Yes    Neck pain [M54.2]  Unknown    Leukocytosis [D72.829]  Unknown    Pneumonia due to infectious organism [J18.9]  Unknown      Resolved Hospital Problems   No resolved problems to display.       Plan:  Since his blood cultures are growing gram-positive cocci in pairs we will ask for infectious disease consult.  Suspecting pneumococcal pneumonia with bacteremia.  Urine antigen for strep pneumonia ordered.  Will get MRI of cervical spine with and without contrast to rule out any infection in the neck.  Will increase Rocephin to 2 g daily.  Follow-up lab.    Edgardo Núñez MD  12/19/2023  13:01 EST

## 2023-12-19 NOTE — ED NOTES
Nursing report ED to floor  Markos Virk  62 y.o.  male    HPI :   Chief Complaint   Patient presents with    Neck Pain     Pt went to [facility], has pnemonia and severe pain in his neck. Pt states that his pain level is 10/10.       Admitting doctor:   Richard Perez MD    Admitting diagnosis:   The primary encounter diagnosis was Pneumonia due to infectious organism, unspecified laterality, unspecified part of lung. Diagnoses of Leukocytosis, unspecified type and Neck pain were also pertinent to this visit.    Code status:   Current Code Status       Date Active Code Status Order ID Comments User Context       Not on file            Allergies:   Patient has no known allergies.    Isolation:   No active isolations    Intake and Output    Intake/Output Summary (Last 24 hours) at 12/19/2023 0233  Last data filed at 12/19/2023 0101  Gross per 24 hour   Intake 100 ml   Output --   Net 100 ml       Weight:       12/18/23  2308   Weight: 69.4 kg (153 lb)       Most recent vitals:   Vitals:    12/19/23 0229 12/19/23 0230 12/19/23 0231 12/19/23 0232   BP:   132/77    Pulse: 89 91  95   Resp:       Temp:       TempSrc:       SpO2: 92% 94%  94%   Weight:       Height:           Active LDAs/IV Access:   Lines, Drains & Airways       Active LDAs       Name Placement date Placement time Site Days    Peripheral IV 12/18/23 2357 Right Antecubital 12/18/23 2357  Antecubital  less than 1                    Labs (abnormal labs have a star):   Labs Reviewed   COMPREHENSIVE METABOLIC PANEL - Abnormal; Notable for the following components:       Result Value    Glucose 162 (*)     Sodium 135 (*)     Alkaline Phosphatase 131 (*)     All other components within normal limits    Narrative:     GFR Normal >60  Chronic Kidney Disease <60  Kidney Failure <15     PROTIME-INR - Abnormal; Notable for the following components:    Protime 15.0 (*)     INR 1.16 (*)     All other components within normal limits   APTT - Abnormal; Notable for the  "following components:    PTT 36.7 (*)     All other components within normal limits   PROCALCITONIN - Abnormal; Notable for the following components:    Procalcitonin 5.45 (*)     All other components within normal limits    Narrative:     As a Marker for Sepsis (Non-Neonates):    1. <0.5 ng/mL represents a low risk of severe sepsis and/or septic shock.  2. >2 ng/mL represents a high risk of severe sepsis and/or septic shock.    As a Marker for Lower Respiratory Tract Infections that require antibiotic therapy:    PCT on Admission    Antibiotic Therapy       6-12 Hrs later    >0.5                Strongly Recommended  >0.25 - <0.5        Recommended   0.1 - 0.25          Discouraged              Remeasure/reassess PCT  <0.1                Strongly Discouraged     Remeasure/reassess PCT    As 28 day mortality risk marker: \"Change in Procalcitonin Result\" (>80% or <=80%) if Day 0 (or Day 1) and Day 4 values are available. Refer to [email or web address]    Change in PCT <=80%  A decrease of PCT levels below or equal to 80% defines a positive change in PCT test result representing a higher risk for 28-day all-cause mortality of patients diagnosed with severe sepsis for septic shock.    Change in PCT >80%  A decrease of PCT levels of more than 80% defines a negative change in PCT result representing a lower risk for 28-day all-cause mortality of patients diagnosed with severe sepsis or septic shock.      CBC WITH AUTO DIFFERENTIAL - Abnormal; Notable for the following components:    WBC 20.96 (*)     RBC 3.90 (*)     Hemoglobin 12.1 (*)     Hematocrit 34.1 (*)     RDW 11.4 (*)     RDW-SD 35.7 (*)     Neutrophil % 83.5 (*)     Lymphocyte % 7.5 (*)     Immature Grans % 0.7 (*)     Neutrophils, Absolute 17.51 (*)     Monocytes, Absolute 1.52 (*)     Immature Grans, Absolute 0.15 (*)     All other components within normal limits   RESPIRATORY PANEL PCR W/ COVID-19 (SARS-COV-2), NP SWAB IN UTM/VTP, 2 HR TAT - " Normal    Narrative:     In the setting of a positive respiratory panel with a viral infection PLUS a negative procalcitonin without other underlying concern for bacterial infection, consider observing off antibiotics or discontinuation of antibiotics and continue supportive care. If the respiratory panel is positive for atypical bacterial infection (Bordetella pertussis, Chlamydophila pneumoniae, or Mycoplasma pneumoniae), consider antibiotic de-escalation to target atypical bacterial infection.   LACTIC ACID, PLASMA - Normal   BLOOD CULTURE   BLOOD CULTURE   URINE DRUG SCREEN   CBC AND DIFFERENTIAL    Narrative:     The following orders were created for panel order CBC & Differential.  Procedure                               Abnormality         Status                     ---------                               -----------         ------                     CBC Auto Differential[[id number]]        Abnormal            Final result                 Please view results for these tests on the individual orders.       EKG:   No orders to display       Meds given in ED:   Medications   sodium chloride 0.9 % flush 10 mL (has no administration in time range)   cefTRIAXone (ROCEPHIN) 1,000 mg in sodium chloride 0.9 % 100 mL IVPB-VTB (0 mg Intravenous Stopped 12/19/23 0101)   azithromycin (ZITHROMAX) 500 mg in sodium chloride 0.9 % 250 mL IVPB-VTB (500 mg Intravenous New Bag 12/19/23 0102)   morphine injection 4 mg (4 mg Intravenous Given 12/18/23 2358)   ondansetron (ZOFRAN) injection 4 mg (4 mg Intravenous Given 12/18/23 2358)   dexAMETHasone (DECADRON) injection 10 mg (10 mg Intravenous Given 12/19/23 0006)   HYDROmorphone (DILAUDID) injection 1 mg (1 mg Intravenous Given 12/19/23 0026)   sodium chloride 0.9 % bolus 1,000 mL (1,000 mL Intravenous New Bag 12/19/23 0102)   HYDROmorphone (DILAUDID) injection 1 mg (1 mg Intravenous Given 12/19/23 0146)       Imaging results:  CT Cervical Spine Without Contrast    Result Date:  12/19/2023  Electronically signed by Johnathon Shelton MD on 12-19-23 at 0107    CT Head Without Contrast    Result Date: 12/19/2023  Electronically signed by Johnathon Shelton MD on 12-19-23 at 0105    XR Chest 1 View    Result Date: 12/19/2023  Electronically signed by Johnathon Shelton MD on 12-19-23 at 0057     Ambulatory status:   ax2    Social issues:   Social History     Socioeconomic History    Marital status: Single       NIH Stroke Scale:       Registered Nurse, RN  12/19/23 02:33 EST

## 2023-12-19 NOTE — ED PROVIDER NOTES
EMERGENCY DEPARTMENT ENCOUNTER    Room Number:  E647/1  PCP: Provider, No Known  Patient Care Team:  Provider, No Known as PCP - General   Independent Historians: Patient    HPI:  Chief Complaint: Neck pain, cough congestion    A complete HPI/ROS/PMH/PSH/SH/FH are unobtainable due to: None    Chronic or social conditions impacting patient care (Social Determinants of Health): None  (Financial Resource Strain / Food Insecurity / Transportation Needs / Physical Activity / Stress / Social Connections / Intimate Partner Violence / Housing Stability)    Context: Markos Virk is a 62 y.o. male who presents to the ED c/o acute cough and congestion for about a week.  Patient also reports neck pain and stiffness for the past 48 hours.  No confusion.  Intermittent fever and chills.  Patient was seen in urgent care earlier today told he had pneumonia in his left lung.  Patient was advised to come to the ER for further evaluation possible meningitis.  Patient reports he has headache and neck stiffness unable to rotate flex or extend his neck without discomfort.  States not had similar pain in the past.    Review of prior external notes (non-ED) -and- Review of prior external test results outside of this encounter: I have reviewed [facility] from 5/30/2014    Prescription drug monitoring program review:         PAST MEDICAL HISTORY  Active Ambulatory Problems     Diagnosis Date Noted    No Active Ambulatory Problems     Resolved Ambulatory Problems     Diagnosis Date Noted    No Resolved Ambulatory Problems     No Additional Past Medical History         PAST SURGICAL HISTORY  No past surgical history on file.      FAMILY HISTORY  No family history on file.      SOCIAL HISTORY  Social History     Socioeconomic History    Marital status: Single         ALLERGIES  Patient has no known allergies.        REVIEW OF SYSTEMS  Review of Systems  Included in HPI  All systems reviewed and negative except for those  discussed in HPI.      PHYSICAL EXAM    I have reviewed the triage vital signs and nursing notes.    ED Triage Vitals   Temp Heart Rate Resp BP SpO2   12/18/23 2302 12/18/23 2302 12/18/23 2302 12/18/23 2309 12/18/23 2302   97.1 °F (36.2 °C) 110 18 (!) 165/107 92 %      Temp src Heart Rate Source Patient Position BP Location FiO2 (%)   12/18/23 2302 12/18/23 2302 -- -- --   Tympanic Monitor          Physical Exam  GENERAL: alert, no acute distress  SKIN: Warm, dry  HENT: Normocephalic, atraumatic, patient unable to flex extend or rotate his neck secondary to pain and tightness, indicates pain to posterior inferior neck with some extension to the left soft tissue  EYES: no scleral icterus  CV: regular rhythm, regular rate  RESPIRATORY: normal effort, crackles to left lung fields  ABDOMEN: soft, nontender, nondistended  MUSCULOSKELETAL: no deformity  NEURO: alert, moves all extremities, follows commands                                                               LAB RESULTS  Recent Results (from the past 24 hour(s))   Comprehensive Metabolic Panel    Collection Time: 12/18/23 11:56 PM    Specimen: Blood   Result Value Ref Range    Glucose 162 (H) 65 - 99 mg/dL    BUN 16 8 - 23 mg/dL    Creatinine 0.92 0.76 - 1.27 mg/dL    Sodium 135 (L) 136 - 145 mmol/L    Potassium 3.5 3.5 - 5.2 mmol/L    Chloride 99 98 - 107 mmol/L    CO2 22.0 22.0 - 29.0 mmol/L    Calcium 8.9 8.6 - 10.5 mg/dL    Total Protein 7.0 6.0 - 8.5 g/dL    Albumin 3.7 3.5 - 5.2 g/dL    ALT (SGPT) 31 1 - 41 U/L    AST (SGOT) 31 1 - 40 U/L    Alkaline Phosphatase 131 (H) 39 - 117 U/L    Total Bilirubin 0.3 0.0 - 1.2 mg/dL    Globulin 3.3 gm/dL    A/G Ratio 1.1 g/dL    BUN/Creatinine Ratio 17.4 7.0 - 25.0    Anion Gap 14.0 5.0 - 15.0 mmol/L    eGFR 95.2 >60.0 mL/min/1.73   Protime-INR    Collection Time: 12/18/23 11:56 PM    Specimen: Blood   Result Value Ref Range    Protime 15.0 (H) 11.7 - 14.2 Seconds    INR 1.16 (H) 0.90 - 1.10   aPTT    Collection Time:  12/18/23 11:56 PM    Specimen: Blood   Result Value Ref Range    PTT 36.7 (H) 22.7 - 35.4 seconds   Lactic Acid, Plasma    Collection Time: 12/18/23 11:56 PM    Specimen: Blood   Result Value Ref Range    Lactate 1.1 0.5 - 2.0 mmol/L   Procalcitonin    Collection Time: 12/18/23 11:56 PM    Specimen: Blood   Result Value Ref Range    Procalcitonin 5.45 (H) 0.00 - 0.25 ng/mL   CBC Auto Differential    Collection Time: 12/18/23 11:56 PM    Specimen: Blood   Result Value Ref Range    WBC 20.96 (H) 3.40 - 10.80 10*3/mm3    RBC 3.90 (L) 4.14 - 5.80 10*6/mm3    Hemoglobin 12.1 (L) 13.0 - 17.7 g/dL    Hematocrit 34.1 (L) 37.5 - 51.0 %    MCV 87.4 79.0 - 97.0 fL    MCH 31.0 26.6 - 33.0 pg    MCHC 35.5 31.5 - 35.7 g/dL    RDW 11.4 (L) 12.3 - 15.4 %    RDW-SD 35.7 (L) 37.0 - 54.0 fl    MPV 10.0 6.0 - 12.0 fL    Platelets 375 140 - 450 10*3/mm3    Neutrophil % 83.5 (H) 42.7 - 76.0 %    Lymphocyte % 7.5 (L) 19.6 - 45.3 %    Monocyte % 7.3 5.0 - 12.0 %    Eosinophil % 0.6 0.3 - 6.2 %    Basophil % 0.4 0.0 - 1.5 %    Immature Grans % 0.7 (H) 0.0 - 0.5 %    Neutrophils, Absolute 17.51 (H) 1.70 - 7.00 10*3/mm3    Lymphocytes, Absolute 1.57 0.70 - 3.10 10*3/mm3    Monocytes, Absolute 1.52 (H) 0.10 - 0.90 10*3/mm3    Eosinophils, Absolute 0.13 0.00 - 0.40 10*3/mm3    Basophils, Absolute 0.08 0.00 - 0.20 10*3/mm3    Immature Grans, Absolute 0.15 (H) 0.00 - 0.05 10*3/mm3    nRBC 0.0 0.0 - 0.2 /100 WBC   Respiratory Panel PCR w/COVID-19(SARS-CoV-2) GINNY/ROLY/JOSE ANTONIO/PAD/COR/JEFFREY In-House, NP Swab in UTM/VTM, 2 HR TAT - Swab, Nasopharynx    Collection Time: 12/19/23 12:01 AM    Specimen: Nasopharynx; Swab   Result Value Ref Range    ADENOVIRUS, PCR Not Detected Not Detected    Coronavirus 229E Not Detected Not Detected    Coronavirus HKU1 Not Detected Not Detected    Coronavirus NL63 Not Detected Not Detected    Coronavirus OC43 Not Detected Not Detected    COVID19 Not Detected Not Detected - Ref. Range    Human Metapneumovirus Not Detected Not  Detected    Human Rhinovirus/Enterovirus Not Detected Not Detected    Influenza A PCR Not Detected Not Detected    Influenza B PCR Not Detected Not Detected    Parainfluenza Virus 1 Not Detected Not Detected    Parainfluenza Virus 2 Not Detected Not Detected    Parainfluenza Virus 3 Not Detected Not Detected    Parainfluenza Virus 4 Not Detected Not Detected    RSV, PCR Not Detected Not Detected    Bordetella pertussis pcr Not Detected Not Detected    Bordetella parapertussis PCR Not Detected Not Detected    Chlamydophila pneumoniae PCR Not Detected Not Detected    Mycoplasma pneumo by PCR Not Detected Not Detected       I ordered the above labs and independently reviewed the results.        RADIOLOGY  CT Cervical Spine Without Contrast    Result Date: 12/19/2023  Patient: JORGE BENTLEY  Time Out: 01:07 Exam(s): CT C SPINE EXAM:   CT Cervical Spine Without Intravenous Contrast CLINICAL HISTORY:    Reason for exam: neck pain. TECHNIQUE:   Axial computed tomography images of the cervical spine without intravenous contrast.  This CT exam was performed according to the principle of ALARA (As Low As Reasonably Achievable) by using one or more of the following dose reduction techniques: automated exposure control, adjustment of the mA and or kV according to patient size, and or use of iterative reconstruction technique. COMPARISON:   No relevant prior studies available. FINDINGS: The vertebral body heights are maintained. The craniocervical junction is intact. The atlanto-dens interval is maintained. The dens is intact. Grade 1 anterolisthesis of C3 on C4, measures 3 mm. Multilevel cervical spondylosis and degenerative disc disease.  Reversal of the cervical lordosis. The unenhanced neck soft tissues are grossly unremarkable.  The visualized lung apices are grossly clear. IMPRESSION: No cervical spine fracture. Grade 1 anterolisthesis of C3 on C4, measures 3 mm.     Electronically signed by Johnathon Shelton MD on 12-19-23 at  0107    CT Head Without Contrast    Result Date: 12/19/2023  Patient: JORGE BENTLEY  Time Out: 01:05 Exam(s): CT HEAD Without Contrast EXAM:   CT Head Without Intravenous Contrast CLINICAL HISTORY:    Reason for exam: ha. TECHNIQUE:   Axial computed tomography images of the head brain without intravenous contrast.  This CT exam was performed according to the principle of ALARA (As Low As Reasonably Achievable) by using one or more of the following dose reduction techniques: automated exposure control, adjustment of the mA and or kV according to patient size, and or use of iterative reconstruction technique. COMPARISON:   No relevant prior studies available. FINDINGS: No acute intracranial hemorrhage.  No midline shift or mass effect. The territorial gray-white matter differentiation is maintained throughout. Age-related cerebral volume loss.  Periventricular and subcortical white matter hypoattenuation, consistent with chronic microangiopathy. The visualized orbits appear grossly unremarkable. The calvarium is intact. The visualized paranasal sinuses and mastoid air cells are grossly clear. IMPRESSION: No acute intracranial hemorrhage, midline shift, or mass effect.     Electronically signed by Johnathon Shelton MD on 12-19-23 at 0105    XR Chest 1 View    Result Date: 12/19/2023  Patient: JORGE BENTLEY  Time Out: 00:57 Exam(s): XR CXR 1 VIEW EXAM:   XR Chest, 1 View CLINICAL HISTORY:    Reason for exam: cough, fever. TECHNIQUE:   Frontal view of the chest. COMPARISON:   No relevant prior studies available. FINDINGS:   Lungs:  Patchy consolidation in the left mid and lower lung field, concerning for pneumonia.   Pleural space:  Unremarkable.  No pneumothorax.   Heart:  Cardiomegaly.   Mediastinum:  Unremarkable.  Normal mediastinal contour.   Bones joints:  Right shoulder ORIF.  No acute fracture. IMPRESSION:       Patchy consolidation in the left mid and lower lung field, concerning for pneumonia.     Electronically signed  by Johnathon Shelton MD on 12-19-23 at 0057    XR Chest 2 View    Result Date: 12/18/2023  INDICATION:  Acute cough. TECHNIQUE:  Frontal and lateral chest. COMPARISON:  None available FINDINGS:   The cardiomediastinal silhouette is normal in size.  There is consolidation or pneumonia in the left lingula and adjacent left lower lobe.  The remainder the lungs appear clear..  There are no pleural effusions.  There is no pneumothorax.  No acute osseous process.  Stopped changes are seen with orthopedic fixation in the right scapula. IMPRESSION: Left lower lobe and left lingular consolidation or pneumonia..   Signed by Romeo Carmona MD     I ordered the above noted radiological studies. Reviewed by me and discussed with radiologist.  See dictation for official radiology interpretation.      PROCEDURES    Procedures      MEDICATIONS GIVEN IN ER    Medications   sodium chloride 0.9 % flush 10 mL (has no administration in time range)   sodium chloride 0.9 % flush 10 mL (has no administration in time range)   sodium chloride 0.9 % flush 10 mL (has no administration in time range)   sodium chloride 0.9 % infusion 40 mL (has no administration in time range)   sennosides-docusate (PERICOLACE) 8.6-50 MG per tablet 2 tablet (has no administration in time range)     And   polyethylene glycol (MIRALAX) packet 17 g (has no administration in time range)     And   bisacodyl (DULCOLAX) EC tablet 5 mg (has no administration in time range)     And   bisacodyl (DULCOLAX) suppository 10 mg (has no administration in time range)   nitroglycerin (NITROSTAT) SL tablet 0.4 mg (has no administration in time range)   cefTRIAXone (ROCEPHIN) 1,000 mg in sodium chloride 0.9 % 100 mL IVPB-VTB (has no administration in time range)   sodium chloride 0.9 % infusion (has no administration in time range)   acetaminophen (TYLENOL) tablet 650 mg (has no administration in time range)     Or   acetaminophen (TYLENOL) 160 MG/5ML oral solution 650 mg (has no  administration in time range)     Or   acetaminophen (TYLENOL) suppository 650 mg (has no administration in time range)   ondansetron (ZOFRAN) injection 4 mg (has no administration in time range)   cefTRIAXone (ROCEPHIN) 1,000 mg in sodium chloride 0.9 % 100 mL IVPB-VTB (0 mg Intravenous Stopped 12/19/23 0101)   azithromycin (ZITHROMAX) 500 mg in sodium chloride 0.9 % 250 mL IVPB-VTB (0 mg Intravenous Stopped 12/19/23 0252)   morphine injection 4 mg (4 mg Intravenous Given 12/18/23 2358)   ondansetron (ZOFRAN) injection 4 mg (4 mg Intravenous Given 12/18/23 2358)   dexAMETHasone (DECADRON) injection 10 mg (10 mg Intravenous Given 12/19/23 0006)   HYDROmorphone (DILAUDID) injection 1 mg (1 mg Intravenous Given 12/19/23 0026)   sodium chloride 0.9 % bolus 1,000 mL (0 mL Intravenous Stopped 12/19/23 0320)   HYDROmorphone (DILAUDID) injection 1 mg (1 mg Intravenous Given 12/19/23 0146)         ORDERS PLACED DURING THIS VISIT:  Orders Placed This Encounter   Procedures    Blood Culture - Blood,    Blood Culture - Blood,    Respiratory Panel PCR w/COVID-19(SARS-CoV-2) GINNY/ROLY/JOSE ANTONIO/PAD/COR/JEFFREY In-House, NP Swab in UTM/VTM, 2 HR TAT - Swab, Nasopharynx    Respiratory Culture - Sputum, Cough    CT Head Without Contrast    CT Cervical Spine Without Contrast    XR Chest 1 View    Comprehensive Metabolic Panel    Protime-INR    aPTT    Lactic Acid, Plasma    Procalcitonin    Urine Drug Screen - Urine, Clean Catch    CBC Auto Differential    Basic Metabolic Panel    CBC Auto Differential    Lactic Acid, Plasma    Protime-INR    Diet: Cardiac Diets; Healthy Heart (2-3 Na+); Texture: Regular Texture (IDDSI 7); Fluid Consistency: Thin (IDDSI 0)    Monitor Blood Pressure    Vital Signs    Intake & Output    Weigh Patient    Oral Care    Place Sequential Compression Device    Maintain Sequential Compression Device    Telemetry - Maintain IV Access    Telemetry - Place Orders & Notify Provider of Results When Patient Experiences Acute  Chest Pain, Dysrhythmia or Respiratory Distress    May Be Off Telemetry for Tests    Pulse Oximetry, Continuous    Up With Assistance    Code Status and Medical Interventions:    LHA (on-call MD unless specified) Details    Insert Peripheral IV    Insert Peripheral IV    Inpatient Admission    CBC & Differential         PROGRESS, DATA ANALYSIS, CONSULTS, AND MEDICAL DECISION MAKING    All labs have been independently interpreted by me.  All radiology studies have been reviewed by me and discussed with radiologist dictating the report.   EKG's independently viewed and interpreted by me.  Discussion below represents my analysis of pertinent findings related to patient's condition, differential diagnosis, treatment plan and final disposition.    Differential diagnosis includes but is not limited to meningitis, pneumonia, torticollis.    Clinical Scores:              ED Course as of 12/19/23 0438   Mon Dec 18, 2023   2344 I discussed options for LP with the patient.  He would prefer to undergo LP under fluoroscopy.  I will comply with his wishes. [TJ]   Tue Dec 19, 2023   0015 WBC(!): 20.96 [TJ]   0015 Hemoglobin(!): 12.1 [TJ]   0015 Platelets: 375 [TJ]   0055 Procalcitonin(!): 5.45 [TJ]   0059 Lactate: 1.1 [TJ]      ED Course User Index  [TJ] Dioni Guardado MD               PPE: The patient wore a mask and I wore an N95 mask throughout the entire patient encounter.       AS OF 04:38 EST VITALS:    BP - 149/88  HR - 83  TEMP - 98 °F (36.7 °C) (Oral)  O2 SATS - 92%        DIAGNOSIS  Final diagnoses:   Pneumonia due to infectious organism, unspecified laterality, unspecified part of lung   Leukocytosis, unspecified type   Neck pain         DISPOSITION  ED Disposition       ED Disposition   Decision to Admit    Condition   --    Comment   Level of Care: Telemetry [5]   Diagnosis: Pneumonia [267300]   Admitting Physician: RODY REBOLLEDO [[id number]]   Certification: I Certify That Inpatient Hospital Services Are  Medically Necessary For Greater Than 2 Midnights                    Note Disclaimer: At [facility], we believe that sharing information builds trust and better relationships. You are receiving this note because you recently visited [facility]. It is possible you will see health information before a provider has talked with you about it. This kind of information can be easy to misunderstand. To help you fully understand what it means for your health, we urge you to discuss this note with your provider.         Dioni Guardado MD  12/19/23 0438

## 2023-12-19 NOTE — CASE MANAGEMENT/SOCIAL WORK
Discharge Planning Assessment  [facility]     Patient Name: Markos Virk  MRN: [id number]  Today's Date: 12/19/2023    Admit Date: 12/18/2023    Plan: Home   Discharge Needs Assessment       Row Name 12/19/23 1445       Living Environment    People in Home friend(s)    Current Living Arrangements home    Potentially Unsafe Housing Conditions none    Primary Care Provided by self    Provides Primary Care For no one    Family Caregiver if Needed none    Quality of Family Relationships unable to assess    Able to Return to Prior Arrangements yes       Resource/Environmental Concerns    Resource/Environmental Concerns none    Transportation Concerns none       Transition Planning    Patient/Family Anticipates Transition to home    Patient/Family Anticipated Services at Transition none    Transportation Anticipated car, drives self;family or friend will provide       Discharge Needs Assessment    Readmission Within the Last 30 Days no previous admission in last 30 days    Equipment Currently Used at Home none    Concerns to be Addressed denies needs/concerns at this time;no discharge needs identified    Anticipated Changes Related to Illness none    Equipment Needed After Discharge none    Provided Post Acute Provider List? N/A    Provided Post Acute Provider Quality & Resource List? N/A                   Discharge Plan       Row Name 12/19/23 1446       Plan    Plan Home    Patient/Family in Agreement with Plan yes    Plan Comments CCP met with the patient at bedside. Introduced self and explained role of CCP. Patient confirmed the information on his face sheet is accurate. Patient is enrolled into [facility]. Patient does not have a PCP. CCP provided patient with a Provider Directory Assistance paper so he can set himself up with a PCP. Patient lives at home with a friend. He is independent at home. He does not use any DME or need any. Patient has never had HH. Patient plans home at discharge and states his friend can  transport. CCP to follow.                  Continued Care and Services - Admitted Since 12/18/2023    Coordination has not been started for this encounter.          Demographic Summary       Row Name 12/19/23 1445       General Information    Admission Type inpatient    Arrived From emergency department    Referral Source admission list    Preferred Language English                   Functional Status       Row Name 12/19/23 1445       Functional Status    Usual Activity Tolerance good    Current Activity Tolerance good       Functional Status, IADL    Medications independent    Meal Preparation independent    Housekeeping independent    Laundry independent    Shopping independent       Mental Status    General Appearance WDL WDL       Mental Status Summary    Recent Changes in Mental Status/Cognitive Functioning no changes       Employment/    Employment Status unemployed                   Psychosocial    No documentation.                  Abuse/Neglect    No documentation.                  Legal    No documentation.                  Substance Abuse    No documentation.                  Patient Forms    No documentation.

## 2023-12-20 ENCOUNTER — APPOINTMENT (OUTPATIENT)
Dept: CARDIOLOGY | Facility: HOSPITAL | Age: 63
End: 2023-12-20
Payer: MEDICAID

## 2023-12-20 LAB
ANION GAP SERPL CALCULATED.3IONS-SCNC: 12.4 MMOL/L (ref 5–15)
ASCENDING AORTA: 2.8 CM
BASOPHILS # BLD AUTO: 0.06 10*3/MM3 (ref 0–0.2)
BASOPHILS NFR BLD AUTO: 0.2 % (ref 0–1.5)
BH CV ECHO MEAS - ACS: 2.4 CM
BH CV ECHO MEAS - AO MAX PG: 11.7 MMHG
BH CV ECHO MEAS - AO MEAN PG: 6 MMHG
BH CV ECHO MEAS - AO ROOT DIAM: 3.9 CM
BH CV ECHO MEAS - AO V2 MAX: 171 CM/SEC
BH CV ECHO MEAS - AO V2 VTI: 29.6 CM
BH CV ECHO MEAS - AVA(I,D): 2.21 CM2
BH CV ECHO MEAS - EDV(CUBED): 91.1 ML
BH CV ECHO MEAS - EDV(MOD-SP2): 110 ML
BH CV ECHO MEAS - EDV(MOD-SP4): 169 ML
BH CV ECHO MEAS - EF(MOD-BP): 52.5 %
BH CV ECHO MEAS - EF(MOD-SP2): 52.7 %
BH CV ECHO MEAS - EF(MOD-SP4): 55 %
BH CV ECHO MEAS - ESV(CUBED): 32.8 ML
BH CV ECHO MEAS - ESV(MOD-SP2): 52 ML
BH CV ECHO MEAS - ESV(MOD-SP4): 76 ML
BH CV ECHO MEAS - FS: 28.9 %
BH CV ECHO MEAS - IVS/LVPW: 0.89 CM
BH CV ECHO MEAS - IVSD: 0.8 CM
BH CV ECHO MEAS - LAT PEAK E' VEL: 9.6 CM/SEC
BH CV ECHO MEAS - LV DIASTOLIC VOL/BSA (35-75): 92.7 CM2
BH CV ECHO MEAS - LV MASS(C)D: 123.1 GRAMS
BH CV ECHO MEAS - LV MAX PG: 5.7 MMHG
BH CV ECHO MEAS - LV MEAN PG: 3 MMHG
BH CV ECHO MEAS - LV SYSTOLIC VOL/BSA (12-30): 41.7 CM2
BH CV ECHO MEAS - LV V1 MAX: 119 CM/SEC
BH CV ECHO MEAS - LV V1 VTI: 20.8 CM
BH CV ECHO MEAS - LVIDD: 4.5 CM
BH CV ECHO MEAS - LVIDS: 3.2 CM
BH CV ECHO MEAS - LVOT AREA: 3.1 CM2
BH CV ECHO MEAS - LVOT DIAM: 2 CM
BH CV ECHO MEAS - LVPWD: 0.9 CM
BH CV ECHO MEAS - MED PEAK E' VEL: 10.9 CM/SEC
BH CV ECHO MEAS - MV A DUR: 0.1 SEC
BH CV ECHO MEAS - MV A MAX VEL: 130 CM/SEC
BH CV ECHO MEAS - MV DEC SLOPE: 639 CM/SEC2
BH CV ECHO MEAS - MV DEC TIME: 0.17 SEC
BH CV ECHO MEAS - MV E MAX VEL: 122 CM/SEC
BH CV ECHO MEAS - MV E/A: 0.94
BH CV ECHO MEAS - MV MAX PG: 7.7 MMHG
BH CV ECHO MEAS - MV MEAN PG: 4 MMHG
BH CV ECHO MEAS - MV P1/2T: 54.5 MSEC
BH CV ECHO MEAS - MV V2 VTI: 25.7 CM
BH CV ECHO MEAS - MVA(P1/2T): 4 CM2
BH CV ECHO MEAS - MVA(VTI): 2.5 CM2
BH CV ECHO MEAS - PA ACC TIME: 0.09 SEC
BH CV ECHO MEAS - PA V2 MAX: 140 CM/SEC
BH CV ECHO MEAS - PULM DIAS VEL: 58.1 CM/SEC
BH CV ECHO MEAS - PULM S/D: 1.43
BH CV ECHO MEAS - PULM SYS VEL: 83.3 CM/SEC
BH CV ECHO MEAS - RV MAX PG: 2.1 MMHG
BH CV ECHO MEAS - RV V1 MAX: 72.5 CM/SEC
BH CV ECHO MEAS - RV V1 VTI: 14.8 CM
BH CV ECHO MEAS - SI(MOD-SP2): 31.8 ML/M2
BH CV ECHO MEAS - SI(MOD-SP4): 51 ML/M2
BH CV ECHO MEAS - SV(LVOT): 65.3 ML
BH CV ECHO MEAS - SV(MOD-SP2): 58 ML
BH CV ECHO MEAS - SV(MOD-SP4): 93 ML
BH CV ECHO MEAS - TAPSE (>1.6): 2.43 CM
BH CV ECHO MEASUREMENTS AVERAGE E/E' RATIO: 11.9
BH CV XLRA - RV BASE: 3.8 CM
BH CV XLRA - RV LENGTH: 8.2 CM
BH CV XLRA - RV MID: 4.1 CM
BH CV XLRA - TDI S': 15.3 CM/SEC
BUN SERPL-MCNC: 17 MG/DL (ref 8–23)
BUN/CREAT SERPL: 20.5 (ref 7–25)
CALCIUM SPEC-SCNC: 8.7 MG/DL (ref 8.6–10.5)
CHLORIDE SERPL-SCNC: 98 MMOL/L (ref 98–107)
CO2 SERPL-SCNC: 22.6 MMOL/L (ref 22–29)
CREAT SERPL-MCNC: 0.83 MG/DL (ref 0.76–1.27)
CRP SERPL-MCNC: 25.86 MG/DL (ref 0–0.5)
DEPRECATED RDW RBC AUTO: 36.3 FL (ref 37–54)
EGFRCR SERPLBLD CKD-EPI 2021: 100.2 ML/MIN/1.73
EOSINOPHIL # BLD AUTO: 0.01 10*3/MM3 (ref 0–0.4)
EOSINOPHIL NFR BLD AUTO: 0 % (ref 0.3–6.2)
ERYTHROCYTE [DISTWIDTH] IN BLOOD BY AUTOMATED COUNT: 11.4 % (ref 12.3–15.4)
GLUCOSE SERPL-MCNC: 268 MG/DL (ref 65–99)
HCT VFR BLD AUTO: 35.4 % (ref 37.5–51)
HGB BLD-MCNC: 12.1 G/DL (ref 13–17.7)
IMM GRANULOCYTES # BLD AUTO: 0.44 10*3/MM3 (ref 0–0.05)
IMM GRANULOCYTES NFR BLD AUTO: 1.7 % (ref 0–0.5)
LEFT ATRIUM VOLUME INDEX: 28.5 ML/M2
LYMPHOCYTES # BLD AUTO: 1.36 10*3/MM3 (ref 0.7–3.1)
LYMPHOCYTES NFR BLD AUTO: 5.4 % (ref 19.6–45.3)
MCH RBC QN AUTO: 30.4 PG (ref 26.6–33)
MCHC RBC AUTO-ENTMCNC: 34.2 G/DL (ref 31.5–35.7)
MCV RBC AUTO: 88.9 FL (ref 79–97)
MONOCYTES # BLD AUTO: 1.39 10*3/MM3 (ref 0.1–0.9)
MONOCYTES NFR BLD AUTO: 5.5 % (ref 5–12)
NEUTROPHILS NFR BLD AUTO: 22.13 10*3/MM3 (ref 1.7–7)
NEUTROPHILS NFR BLD AUTO: 87.2 % (ref 42.7–76)
NRBC BLD AUTO-RTO: 0 /100 WBC (ref 0–0.2)
PLATELET # BLD AUTO: 511 10*3/MM3 (ref 140–450)
PMV BLD AUTO: 10.3 FL (ref 6–12)
POTASSIUM SERPL-SCNC: 3.4 MMOL/L (ref 3.5–5.2)
RBC # BLD AUTO: 3.98 10*6/MM3 (ref 4.14–5.8)
SINUS: 3.3 CM
SODIUM SERPL-SCNC: 133 MMOL/L (ref 136–145)
STJ: 2.49 CM
WBC NRBC COR # BLD AUTO: 25.39 10*3/MM3 (ref 3.4–10.8)

## 2023-12-20 PROCEDURE — 86140 C-REACTIVE PROTEIN: CPT | Performed by: HOSPITALIST

## 2023-12-20 PROCEDURE — 99232 SBSQ HOSP IP/OBS MODERATE 35: CPT | Performed by: INTERNAL MEDICINE

## 2023-12-20 PROCEDURE — 85025 COMPLETE CBC W/AUTO DIFF WBC: CPT | Performed by: HOSPITALIST

## 2023-12-20 PROCEDURE — 25810000003 SODIUM CHLORIDE 0.9 % SOLUTION: Performed by: NURSE PRACTITIONER

## 2023-12-20 PROCEDURE — 93306 TTE W/DOPPLER COMPLETE: CPT

## 2023-12-20 PROCEDURE — 25010000002 HYDROMORPHONE PER 4 MG: Performed by: NURSE PRACTITIONER

## 2023-12-20 PROCEDURE — 80048 BASIC METABOLIC PNL TOTAL CA: CPT | Performed by: HOSPITALIST

## 2023-12-20 PROCEDURE — 93306 TTE W/DOPPLER COMPLETE: CPT | Performed by: INTERNAL MEDICINE

## 2023-12-20 PROCEDURE — 25510000001 PERFLUTREN (DEFINITY) 8.476 MG IN SODIUM CHLORIDE (PF) 0.9 % 10 ML INJECTION: Performed by: INTERNAL MEDICINE

## 2023-12-20 PROCEDURE — 99203 OFFICE O/P NEW LOW 30 MIN: CPT

## 2023-12-20 PROCEDURE — 25010000002 CEFTRIAXONE PER 250 MG: Performed by: HOSPITALIST

## 2023-12-20 PROCEDURE — 87040 BLOOD CULTURE FOR BACTERIA: CPT | Performed by: INTERNAL MEDICINE

## 2023-12-20 RX ORDER — OXYCODONE HYDROCHLORIDE AND ACETAMINOPHEN 5; 325 MG/1; MG/1
2 TABLET ORAL EVERY 4 HOURS PRN
Status: DISCONTINUED | OUTPATIENT
Start: 2023-12-20 | End: 2023-12-25 | Stop reason: SDUPTHER

## 2023-12-20 RX ORDER — LORAZEPAM 2 MG/ML
0.5 INJECTION INTRAMUSCULAR ONCE
Status: COMPLETED | OUTPATIENT
Start: 2023-12-20 | End: 2023-12-21

## 2023-12-20 RX ADMIN — Medication 10 ML: at 08:12

## 2023-12-20 RX ADMIN — OXYCODONE AND ACETAMINOPHEN 1 TABLET: 5; 325 TABLET ORAL at 03:13

## 2023-12-20 RX ADMIN — SENNOSIDES AND DOCUSATE SODIUM 2 TABLET: 50; 8.6 TABLET ORAL at 10:07

## 2023-12-20 RX ADMIN — HYDROMORPHONE HYDROCHLORIDE 0.5 MG: 1 INJECTION, SOLUTION INTRAMUSCULAR; INTRAVENOUS; SUBCUTANEOUS at 20:58

## 2023-12-20 RX ADMIN — CYCLOBENZAPRINE 10 MG: 10 TABLET, FILM COATED ORAL at 08:11

## 2023-12-20 RX ADMIN — HYDROMORPHONE HYDROCHLORIDE 0.5 MG: 1 INJECTION, SOLUTION INTRAMUSCULAR; INTRAVENOUS; SUBCUTANEOUS at 00:23

## 2023-12-20 RX ADMIN — OXYCODONE AND ACETAMINOPHEN 1 TABLET: 5; 325 TABLET ORAL at 08:11

## 2023-12-20 RX ADMIN — HYDROMORPHONE HYDROCHLORIDE 0.5 MG: 1 INJECTION, SOLUTION INTRAMUSCULAR; INTRAVENOUS; SUBCUTANEOUS at 10:07

## 2023-12-20 RX ADMIN — HYDROMORPHONE HYDROCHLORIDE 0.5 MG: 1 INJECTION, SOLUTION INTRAMUSCULAR; INTRAVENOUS; SUBCUTANEOUS at 15:14

## 2023-12-20 RX ADMIN — Medication 10 ML: at 20:55

## 2023-12-20 RX ADMIN — CEFTRIAXONE 2000 MG: 2 INJECTION, POWDER, FOR SOLUTION INTRAMUSCULAR; INTRAVENOUS at 22:00

## 2023-12-20 RX ADMIN — CYCLOBENZAPRINE 10 MG: 10 TABLET, FILM COATED ORAL at 03:13

## 2023-12-20 RX ADMIN — OXYCODONE AND ACETAMINOPHEN 2 TABLET: 5; 325 TABLET ORAL at 12:02

## 2023-12-20 RX ADMIN — SODIUM CHLORIDE 100 ML/HR: 9 INJECTION, SOLUTION INTRAVENOUS at 10:07

## 2023-12-20 RX ADMIN — SODIUM CHLORIDE 100 ML/HR: 9 INJECTION, SOLUTION INTRAVENOUS at 20:57

## 2023-12-20 RX ADMIN — SENNOSIDES AND DOCUSATE SODIUM 2 TABLET: 50; 8.6 TABLET ORAL at 20:55

## 2023-12-20 RX ADMIN — PERFLUTREN 2 ML: 6.52 INJECTION, SUSPENSION INTRAVENOUS at 14:36

## 2023-12-20 RX ADMIN — HYDROMORPHONE HYDROCHLORIDE 0.5 MG: 1 INJECTION, SOLUTION INTRAMUSCULAR; INTRAVENOUS; SUBCUTANEOUS at 05:37

## 2023-12-20 NOTE — PLAN OF CARE
Goal Outcome Evaluation:  Plan of Care Reviewed With: patient        Progress: no change  Outcome Evaluation: Pt up ad linda in room, sat in chair for most part, c/o severe neck pain all shift, medicated with prn percocet and dilaudid iv, some releif obtained but pain is constant and neck is stiff, afebrile, bld cs positive, Dr Núñez made aware, ID consulted , ivf cont, MRI pending, sr on tele, O2 1L in use all day satting 95%,

## 2023-12-20 NOTE — PLAN OF CARE
Goal Outcome Evaluation:  Plan of Care Reviewed With: patient           Outcome Evaluation: Medicated for neck and head spasms/pain. Periods of restlessness, refusing IVF's and wearing telemetry/02 sat probe. Up and about ad linda in room. Room air. Telemetry SR.

## 2023-12-20 NOTE — CONSULTS
[facility] NEUROSURGERY CONSULT NOTE    Patient name: Markos Virk  Referring Provider:    Edgardo Núñez MD     Reason for Consultation: Neck pain, neck stiffness, cervical spine MRI findings  Patient Care Team:  Provider, No Known as PCP - General    Chief complaint: Neck pain, neck stiffness    Subjective .     History of present illness:    Patient is a 62 y.o.  male who presents to the hospital with complaint of cough and congestion for about 1 week.  Patient states he was also lifting heavy equipment last Monday through Wednesday.  He states that he began getting sick Thursday night.  On Saturday he began having neck pain and stiffness.  He reports headaches that radiate from the neck into the posterior occipital region.  He denies vision change, weakness, numbness, tingling, gait instability.  He does not appear to have any count of sensitivity to light.  Walk around the room without any issue besides the fact that his neck is somewhat stiff.    Review of Systems  Review of Systems   Eyes:  Negative for photophobia and visual disturbance.   Gastrointestinal:         No known bowel issues   Genitourinary:  Negative for enuresis.   Musculoskeletal:  Negative for back pain and gait problem.   Neurological:  Positive for headaches (Posterior, radiating from neck). Negative for weakness and numbness.   Psychiatric/Behavioral:  Negative for confusion.        History  PAST MEDICAL HISTORY  History reviewed. No pertinent past medical history.    PAST SURGICAL HISTORY  History reviewed. No pertinent surgical history.    FAMILY HISTORY  History reviewed. No pertinent family history.    SOCIAL HISTORY  Social History     Tobacco Use    Smoking status: Former     Types: Cigarettes   Vaping Use    Vaping Use: Never used   Substance Use Topics    Drug use: Not Currently       Allergies:  Patient has no known allergies.    MEDICATIONS:  No medications prior to admission.       Objective     Results Review:  LABS:    Admission  on 12/18/2023   Component Date Value Ref Range Status    Glucose 12/18/2023 162 (H)  65 - 99 mg/dL Final    BUN 12/18/2023 16  8 - 23 mg/dL Final    Creatinine 12/18/2023 0.92  0.76 - 1.27 mg/dL Final    Sodium 12/18/2023 135 (L)  136 - 145 mmol/L Final    Potassium 12/18/2023 3.5  3.5 - 5.2 mmol/L Final    Chloride 12/18/2023 99  98 - 107 mmol/L Final    CO2 12/18/2023 22.0  22.0 - 29.0 mmol/L Final    Calcium 12/18/2023 8.9  8.6 - 10.5 mg/dL Final    Total Protein 12/18/2023 7.0  6.0 - 8.5 g/dL Final    Albumin 12/18/2023 3.7  3.5 - 5.2 g/dL Final    ALT (SGPT) 12/18/2023 31  1 - 41 U/L Final    AST (SGOT) 12/18/2023 31  1 - 40 U/L Final    Alkaline Phosphatase 12/18/2023 131 (H)  39 - 117 U/L Final    Total Bilirubin 12/18/2023 0.3  0.0 - 1.2 mg/dL Final    Globulin 12/18/2023 3.3  gm/dL Final    A/G Ratio 12/18/2023 1.1  g/dL Final    BUN/Creatinine Ratio 12/18/2023 17.4  7.0 - 25.0 Final    Anion Gap 12/18/2023 14.0  5.0 - 15.0 mmol/L Final    eGFR 12/18/2023 95.2  >60.0 mL/min/1.73 Final    Protime 12/18/2023 15.0 (H)  11.7 - 14.2 Seconds Final    INR 12/18/2023 1.16 (H)  0.90 - 1.10 Final    PTT 12/18/2023 36.7 (H)  22.7 - 35.4 seconds Final    Blood Culture 12/18/2023 Abnormal Stain (C)   Preliminary    Gram Stain 12/18/2023 Aerobic Bottle Gram positive cocci in pairs (C)   Preliminary    Gram Stain 12/18/2023 Anaerobic Bottle Gram positive cocci in pairs (C)   Preliminary    Blood Culture 12/19/2023 Abnormal Stain (C)   Preliminary    Gram Stain 12/19/2023 Aerobic Bottle Gram positive cocci in pairs (C)   Preliminary    Lactate 12/18/2023 1.1  0.5 - 2.0 mmol/L Final    Procalcitonin 12/18/2023 5.45 (H)  0.00 - 0.25 ng/mL Final    ADENOVIRUS, PCR 12/19/2023 Not Detected  Not Detected Final    Coronavirus 229E 12/19/2023 Not Detected  Not Detected Final    Coronavirus HKU1 12/19/2023 Not Detected  Not Detected Final    Coronavirus NL63 12/19/2023 Not Detected  Not Detected Final    Coronavirus OC43 12/19/2023  Not Detected  Not Detected Final    COVID19 12/19/2023 Not Detected  Not Detected - Ref. Range Final    Human Metapneumovirus 12/19/2023 Not Detected  Not Detected Final    Human Rhinovirus/Enterovirus 12/19/2023 Not Detected  Not Detected Final    Influenza A PCR 12/19/2023 Not Detected  Not Detected Final    Influenza B PCR 12/19/2023 Not Detected  Not Detected Final    Parainfluenza Virus 1 12/19/2023 Not Detected  Not Detected Final    Parainfluenza Virus 2 12/19/2023 Not Detected  Not Detected Final    Parainfluenza Virus 3 12/19/2023 Not Detected  Not Detected Final    Parainfluenza Virus 4 12/19/2023 Not Detected  Not Detected Final    RSV, PCR 12/19/2023 Not Detected  Not Detected Final    Bordetella pertussis pcr 12/19/2023 Not Detected  Not Detected Final    Bordetella parapertussis PCR 12/19/2023 Not Detected  Not Detected Final    Chlamydophila pneumoniae PCR 12/19/2023 Not Detected  Not Detected Final    Mycoplasma pneumo by PCR 12/19/2023 Not Detected  Not Detected Final    Amphet/Methamphet, Screen 12/19/2023 Positive (A)  Negative Final    Barbiturates Screen, Urine 12/19/2023 Negative  Negative Final    Benzodiazepine Screen, Urine 12/19/2023 Negative  Negative Final    Cocaine Screen, Urine 12/19/2023 Negative  Negative Final    Opiate Screen 12/19/2023 Positive (A)  Negative Final    THC, Screen, Urine 12/19/2023 Negative  Negative Final    Methadone Screen, Urine 12/19/2023 Negative  Negative Final    Oxycodone Screen, Urine 12/19/2023 Negative  Negative Final    Fentanyl, Urine 12/19/2023 Negative  Negative Final    WBC 12/18/2023 20.96 (H)  3.40 - 10.80 10*3/mm3 Final    RBC 12/18/2023 3.90 (L)  4.14 - 5.80 10*6/mm3 Final    Hemoglobin 12/18/2023 12.1 (L)  13.0 - 17.7 g/dL Final    Hematocrit 12/18/2023 34.1 (L)  37.5 - 51.0 % Final    MCV 12/18/2023 87.4  79.0 - 97.0 fL Final    MCH 12/18/2023 31.0  26.6 - 33.0 pg Final    MCHC 12/18/2023 35.5  31.5 - 35.7 g/dL Final    RDW 12/18/2023 11.4 (L)   12.3 - 15.4 % Final    RDW-SD 12/18/2023 35.7 (L)  37.0 - 54.0 fl Final    MPV 12/18/2023 10.0  6.0 - 12.0 fL Final    Platelets 12/18/2023 375  140 - 450 10*3/mm3 Final    Neutrophil % 12/18/2023 83.5 (H)  42.7 - 76.0 % Final    Lymphocyte % 12/18/2023 7.5 (L)  19.6 - 45.3 % Final    Monocyte % 12/18/2023 7.3  5.0 - 12.0 % Final    Eosinophil % 12/18/2023 0.6  0.3 - 6.2 % Final    Basophil % 12/18/2023 0.4  0.0 - 1.5 % Final    Immature Grans % 12/18/2023 0.7 (H)  0.0 - 0.5 % Final    Neutrophils, Absolute 12/18/2023 17.51 (H)  1.70 - 7.00 10*3/mm3 Final    Lymphocytes, Absolute 12/18/2023 1.57  0.70 - 3.10 10*3/mm3 Final    Monocytes, Absolute 12/18/2023 1.52 (H)  0.10 - 0.90 10*3/mm3 Final    Eosinophils, Absolute 12/18/2023 0.13  0.00 - 0.40 10*3/mm3 Final    Basophils, Absolute 12/18/2023 0.08  0.00 - 0.20 10*3/mm3 Final    Immature Grans, Absolute 12/18/2023 0.15 (H)  0.00 - 0.05 10*3/mm3 Final    nRBC 12/18/2023 0.0  0.0 - 0.2 /100 WBC Final    Respiratory Culture 12/19/2023 Scant growth (1+) The culture consists of normal respiratory kyle. This is a preliminary report; final report to follow.   Preliminary    Gram Stain 12/19/2023 Moderate (3+) WBCs seen   Preliminary    Gram Stain 12/19/2023 Rare (1+) Epithelial cells per low power field   Preliminary    Gram Stain 12/19/2023 Rare (1+) Mixed bacterial kyle   Preliminary    Glucose 12/19/2023 204 (H)  65 - 99 mg/dL Final    BUN 12/19/2023 14  8 - 23 mg/dL Final    Creatinine 12/19/2023 0.73 (L)  0.76 - 1.27 mg/dL Final    Sodium 12/19/2023 136  136 - 145 mmol/L Final    Potassium 12/19/2023 3.6  3.5 - 5.2 mmol/L Final    Chloride 12/19/2023 100  98 - 107 mmol/L Final    CO2 12/19/2023 22.2  22.0 - 29.0 mmol/L Final    Calcium 12/19/2023 7.9 (L)  8.6 - 10.5 mg/dL Final    BUN/Creatinine Ratio 12/19/2023 19.2  7.0 - 25.0 Final    Anion Gap 12/19/2023 13.8  5.0 - 15.0 mmol/L Final    eGFR 12/19/2023 104.2  >60.0 mL/min/1.73 Final    WBC 12/19/2023 19.98 (H)   3.40 - 10.80 10*3/mm3 Final    RBC 12/19/2023 4.16  4.14 - 5.80 10*6/mm3 Final    Hemoglobin 12/19/2023 12.0 (L)  13.0 - 17.7 g/dL Final    Hematocrit 12/19/2023 36.2 (L)  37.5 - 51.0 % Final    MCV 12/19/2023 87.0  79.0 - 97.0 fL Final    MCH 12/19/2023 28.8  26.6 - 33.0 pg Final    MCHC 12/19/2023 33.1  31.5 - 35.7 g/dL Final    RDW 12/19/2023 11.3 (L)  12.3 - 15.4 % Final    RDW-SD 12/19/2023 35.4 (L)  37.0 - 54.0 fl Final    MPV 12/19/2023 10.3  6.0 - 12.0 fL Final    Platelets 12/19/2023 398  140 - 450 10*3/mm3 Final    Neutrophil % 12/19/2023 93.5 (H)  42.7 - 76.0 % Final    Lymphocyte % 12/19/2023 2.0 (L)  19.6 - 45.3 % Final    Monocyte % 12/19/2023 3.0 (L)  5.0 - 12.0 % Final    Eosinophil % 12/19/2023 0.1 (L)  0.3 - 6.2 % Final    Basophil % 12/19/2023 0.3  0.0 - 1.5 % Final    Immature Grans % 12/19/2023 1.1 (H)  0.0 - 0.5 % Final    Neutrophils, Absolute 12/19/2023 18.73 (H)  1.70 - 7.00 10*3/mm3 Final    Lymphocytes, Absolute 12/19/2023 0.39 (L)  0.70 - 3.10 10*3/mm3 Final    Monocytes, Absolute 12/19/2023 0.59  0.10 - 0.90 10*3/mm3 Final    Eosinophils, Absolute 12/19/2023 0.01  0.00 - 0.40 10*3/mm3 Final    Basophils, Absolute 12/19/2023 0.05  0.00 - 0.20 10*3/mm3 Final    Immature Grans, Absolute 12/19/2023 0.21 (H)  0.00 - 0.05 10*3/mm3 Final    nRBC 12/19/2023 0.0  0.0 - 0.2 /100 WBC Final    Lactate 12/19/2023 1.2  0.5 - 2.0 mmol/L Final    Protime 12/19/2023 14.9 (H)  11.7 - 14.2 Seconds Final    INR 12/19/2023 1.16 (H)  0.90 - 1.10 Final    Strep Pneumo Ag 12/19/2023 Positive (A)  Negative Final    LEGIONELLA ANTIGEN, URINE 12/19/2023 Negative  Negative Final    BCID, PCR 12/18/2023 Streptococcus pneumoniae. Identification by BCID2 PCR. (A)  Negative by BCID PCR. Culture to Follow. Final    BOTTLE TYPE 12/18/2023 Aerobic Bottle   Final    Glucose 12/20/2023 268 (H)  65 - 99 mg/dL Final    BUN 12/20/2023 17  8 - 23 mg/dL Final    Creatinine 12/20/2023 0.83  0.76 - 1.27 mg/dL Final    Sodium  12/20/2023 133 (L)  136 - 145 mmol/L Final    Potassium 12/20/2023 3.4 (L)  3.5 - 5.2 mmol/L Final    Chloride 12/20/2023 98  98 - 107 mmol/L Final    CO2 12/20/2023 22.6  22.0 - 29.0 mmol/L Final    Calcium 12/20/2023 8.7  8.6 - 10.5 mg/dL Final    BUN/Creatinine Ratio 12/20/2023 20.5  7.0 - 25.0 Final    Anion Gap 12/20/2023 12.4  5.0 - 15.0 mmol/L Final    eGFR 12/20/2023 100.2  >60.0 mL/min/1.73 Final    C-Reactive Protein 12/20/2023 25.86 (H)  0.00 - 0.50 mg/dL Final    WBC 12/20/2023 25.39 (H)  3.40 - 10.80 10*3/mm3 Final    RBC 12/20/2023 3.98 (L)  4.14 - 5.80 10*6/mm3 Final    Hemoglobin 12/20/2023 12.1 (L)  13.0 - 17.7 g/dL Final    Hematocrit 12/20/2023 35.4 (L)  37.5 - 51.0 % Final    MCV 12/20/2023 88.9  79.0 - 97.0 fL Final    MCH 12/20/2023 30.4  26.6 - 33.0 pg Final    MCHC 12/20/2023 34.2  31.5 - 35.7 g/dL Final    RDW 12/20/2023 11.4 (L)  12.3 - 15.4 % Final    RDW-SD 12/20/2023 36.3 (L)  37.0 - 54.0 fl Final    MPV 12/20/2023 10.3  6.0 - 12.0 fL Final    Platelets 12/20/2023 511 (H)  140 - 450 10*3/mm3 Final    Neutrophil % 12/20/2023 87.2 (H)  42.7 - 76.0 % Final    Lymphocyte % 12/20/2023 5.4 (L)  19.6 - 45.3 % Final    Monocyte % 12/20/2023 5.5  5.0 - 12.0 % Final    Eosinophil % 12/20/2023 0.0 (L)  0.3 - 6.2 % Final    Basophil % 12/20/2023 0.2  0.0 - 1.5 % Final    Immature Grans % 12/20/2023 1.7 (H)  0.0 - 0.5 % Final    Neutrophils, Absolute 12/20/2023 22.13 (H)  1.70 - 7.00 10*3/mm3 Final    Lymphocytes, Absolute 12/20/2023 1.36  0.70 - 3.10 10*3/mm3 Final    Monocytes, Absolute 12/20/2023 1.39 (H)  0.10 - 0.90 10*3/mm3 Final    Eosinophils, Absolute 12/20/2023 0.01  0.00 - 0.40 10*3/mm3 Final    Basophils, Absolute 12/20/2023 0.06  0.00 - 0.20 10*3/mm3 Final    Immature Grans, Absolute 12/20/2023 0.44 (H)  0.00 - 0.05 10*3/mm3 Final    nRBC 12/20/2023 0.0  0.0 - 0.2 /100 WBC Final       DIAGNOSTICS:  MRI cervical spine with and without contrast 12/19/2023:    FINDINGS: The study is limited  by patient motion. The sagittal T2  sequence demonstrates a thin plane of increased signal intensity in the  prevertebral soft tissues extending from the inferior aspect of C2 to  the inferior aspect of C4, nonspecific.     Signal intensity within the cervical cord is normal on the axial and  sagittal T2 sequences. Fluid is present within the mastoid air cells on  the right.     There is reversal of the normal cervical lordosis with the apex at the  level of C4. There is a grade 1 retrolisthesis of C4 upon C5 estimated  to be approximately 3 mm. Three millimeters retrolisthesis of C5 upon C6  and C6 upon C7 is appreciated. There is approximately 3 to 4 mm of  anterolisthesis of C7 upon T1.     C2-3: Moderate facet degenerative disease is present on the left.     C3-4: There is mild canal stenosis secondary to a broad-based disc  osteophyte complex. Mild to moderate facet degenerative disease is  present on the left and right respectively. Moderate foraminal stenosis  is present on the right secondary to uncovertebral degenerative disease  and facet hypertrophy.     C4-5: There is moderate canal stenosis secondary to a broad-based disc  osteophyte complex and the retrolisthesis of C4 upon C5. This abuts and  flattens the cord moderately centrally, slightly more so to the right of  midline. Moderate foraminal stenosis is present on the left secondary to  facet and uncovertebral degenerative disease.     C5-6: There is moderate canal stenosis secondary to a broad-based disc  osteophyte complex which is more prominent to the left. It abuts and  mildly flattens the cord centrally and to the left. Cerebrospinal fluid  is effaced ventral and dorsal to the cord. The spinal stenosis is  accentuated by the retrolisthesis of C5 upon C6. Moderate and moderate  to severe facet degenerative disease is present on the left and right  respectively. Severe foraminal stenosis is present bilaterally secondary  to loss of disc height,  uncovertebral degenerative disease and extension  of disc-osteophyte complex into the neural foramen.     C6-7: There is mild canal stenosis secondary to a central broad-based  disc osteophyte complex which is more prominent to the left and  accentuated by the retrolisthesis of C6 upon C7. Moderate foraminal  stenosis is present bilaterally secondary to loss of disc height and  uncovertebral degenerative disease.     C7-T1: There is no evidence of disc bulge or herniation.     After contrast administration there was mild enhancement of the  prevertebral soft tissues extending from the inferior aspect of C2-C4.  This corresponds to the area of T2 hyperintensity noted on the sagittal  fat-suppressed T2 sequence. The appearance is nonspecific. This may  represent edema/inflammation. There is no evidence of marrow edema or  enhancement involving the discs or vertebral bodies. Clinical  correlation suggested..     IMPRESSION:  1.  IMPRESSION: The study is degraded by patient motion. There is no  evidence of cord signal abnormality or a focal herniation. Multilevel  degenerative disease involving the cervical spine is noted as described  above including multilevel loss of disc height, retrolisthesis of C4  upon C5, C5 upon C6, C6 upon C7 and anterolisthesis of C7 upon T1 and  broad-based disc osteophyte complexes and foraminal stenosis. Canal  stenosis is most prominent at C4-5 and C5-6 where there is moderate  canal stenosis and moderate flattening of the cord at each level.  Cerebrospinal fluid is effaced ventral and dorsal to the cord at each  level. At C4-5 the disc osteophyte complex is more prominent to the  right. At C5-6 the disc osteophyte complex is slightly more prominent to  the left. See above.  2.  There is a thin plane of T2 hyperintensity anterior to the cervical  spine extending from the inferior aspect of C2 through the inferior  aspect of C4, nonspecific but suggesting edema. This area enhances  after  contrast administration.       Results Review        I reviewed the patient's new clinical results.  I personally viewed and interpreted the patient's labs, imaging study, medications and chart    Vital Signs   Temp:  [97.9 °F (36.6 °C)-98.2 °F (36.8 °C)] 97.9 °F (36.6 °C)  Heart Rate:  [85-95] 95  Resp:  [16] 16  BP: (141-155)/() 155/89    Physical Exam:  Physical Exam  Vitals reviewed.   Constitutional:       General: He is not in acute distress.     Appearance: Normal appearance. He is not ill-appearing, toxic-appearing or diaphoretic.   HENT:      Head: Normocephalic.      Nose: Nose normal.      Mouth/Throat:      Mouth: Mucous membranes are moist.      Pharynx: Oropharynx is clear.   Eyes:      Extraocular Movements: Extraocular movements intact.      Conjunctiva/sclera: Conjunctivae normal.   Cardiovascular:      Rate and Rhythm: Normal rate.   Pulmonary:      Effort: Pulmonary effort is normal.   Musculoskeletal:         General: Normal range of motion.      Cervical back: Spasms and tenderness present. No swelling or bony tenderness.   Skin:     General: Skin is warm.   Neurological:      General: No focal deficit present.      Mental Status: He is alert and oriented to person, place, and time. Mental status is at baseline.      Gait: Gait is intact.      Deep Tendon Reflexes:      Reflex Scores:       Tricep reflexes are 2+ on the right side and 2+ on the left side.       Bicep reflexes are 2+ on the right side.       Brachioradialis reflexes are 2+ on the right side and 2+ on the left side.  Psychiatric:         Mood and Affect: Mood normal.         Speech: Speech normal.         Behavior: Behavior normal.         Thought Content: Thought content normal.         Judgment: Judgment normal.       Neurologic Exam     Mental Status   Oriented to person, place, and time.   Attention: normal. Concentration: normal.   Speech: speech is normal   Level of consciousness: alert  Knowledge: consistent  with education.     Motor Exam   Muscle bulk: normal  Overall muscle tone: normal  Right arm tone: normal  Left arm tone: normal  Right arm pronator drift: absent  Left arm pronator drift: absent  Right leg tone: normal  Left leg tone: normal    Strength   Right deltoid: 5/5  Left deltoid: 5/5  Right biceps: 5/5  Left biceps: 5/5  Right triceps: 5/5  Left triceps: 5/5  Right wrist flexion: 5/5  Left wrist flexion: 5/5  Right wrist extension: 5/5  Left wrist extension: 5/5  Right interossei: 5/5  Left interossei: 5/5    Sensory Exam   Light touch normal.     Gait, Coordination, and Reflexes     Gait  Gait: normal    Tremor   Resting tremor: absent    Reflexes   Right brachioradialis: 2+  Left brachioradialis: 2+  Right biceps: 2+  Right triceps: 2+  Left triceps: 2+  Right Stinson: absent  Left Stinson: absent      Assessment & Plan       Pneumonia    Neck pain    Leukocytosis    Pneumonia due to infectious organism      62-year-old male with history of heavy lifting just over a week ago with upper and lower respiratory tract infection for about 1 week with new diagnosis of pneumonia.  Cervical MRI was completed and did show some areas of mild to at least moderate stenosis as well as some areas of neuroforaminal narrowing.  There also is an area of prevertebral soft tissue swelling which may be due to persistent cough over the past week.  Neurologically he is not reporting any red flag symptoms in the arms or legs and no loss of bowel or bladder.  I think his issues are related to muscle strain and spasm secondary to the work he did over a week ago.  Neurosurgery recommends muscle relaxers and continue treatment per ID team for finding of positive blood cultures/diagnosis of pneumonia.  Patient may follow-up if he develops any new issues or if neck stiffness and pain fails to improve.    PLAN:     Recommend muscle relaxers  Continue treatment per ID team  Neurosurgery signing off  Follow-up as needed      I discussed  "the patient's findings and my recommendations with patient and Dr. Nash.    During patient visit, I utilized appropriate personal protective equipment including mask and gloves.  Mask used was standard procedure mask. Appropriate PPE was worn during the entire visit.  Hand hygiene was completed before and after.     Roger Polo, APRN  12/20/23  12:11 EST    \"Dictated utilizing Dragon dictation\".    "

## 2023-12-20 NOTE — PROGRESS NOTES
"DAILY PROGRESS NOTE  [facility]    Patient Identification:  Name: Markos Virk  Age: 62 y.o.  Sex: male  :  1960  MRN: [id number]         Primary Care Physician: Provider, No Known    Subjective:  Interval History: He complains of neck pain.    Objective:    Scheduled Meds:cefTRIAXone, 2,000 mg, Intravenous, Q24H  senna-docusate sodium, 2 tablet, Oral, BID  sodium chloride, 10 mL, Intravenous, Q12H      Continuous Infusions:sodium chloride, 100 mL/hr, Last Rate: 100 mL/hr (23 1007)        Vital signs in last 24 hours:  Temp:  [97.9 °F (36.6 °C)-98.2 °F (36.8 °C)] 97.9 °F (36.6 °C)  Heart Rate:  [85-95] 95  Resp:  [16] 16  BP: (141-155)/() 155/89    Intake/Output:    Intake/Output Summary (Last 24 hours) at 2023 1304  Last data filed at 2023 0901  Gross per 24 hour   Intake 1240 ml   Output 700 ml   Net 540 ml       Exam:  /89 (BP Location: Right arm, Patient Position: Lying)   Pulse 95   Temp 97.9 °F (36.6 °C) (Oral)   Resp 16   Ht 172.7 cm (68\")   Wt 69.4 kg (153 lb)   SpO2 92%   BMI 23.26 kg/m²     General Appearance:    Alert, cooperative, no distress   Head:    Normocephalic, without obvious abnormality, atraumatic   Eyes:       Throat:   Lips, tongue, gums normal   Neck:   Supple, symmetrical, trachea midline, no JVD   Lungs:     Clear to auscultation bilaterally, respirations unlabored   Chest Wall:    No tenderness or deformity    Heart:    Regular rate and rhythm, S1 and S2 normal, no murmur,no  Rub or gallop   Abdomen:     Soft, nontender, bowel sounds active, no masses, no organomegaly    Extremities:   Extremities normal, atraumatic, no cyanosis or edema   Pulses:      Skin:   Skin is warm and dry,  no rashes or palpable lesions   Neurologic:   no focal deficits noted      Lab Results (last 72 hours)       Procedure Component Value Units Date/Time    Respiratory Culture - Sputum, Cough [[id number]] Collected: 23 0945    Specimen: Sputum " from Cough Updated: 12/20/23 0954     Respiratory Culture Scant growth (1+) The culture consists of normal respiratory kyle. This is a preliminary report; final report to follow.     Gram Stain Moderate (3+) WBCs seen      Rare (1+) Epithelial cells per low power field      Rare (1+) Mixed bacterial kyle    Basic Metabolic Panel [[id number]]  (Abnormal) Collected: 12/20/23 0558    Specimen: Blood Updated: 12/20/23 0716     Glucose 268 mg/dL      BUN 17 mg/dL      Creatinine 0.83 mg/dL      Sodium 133 mmol/L      Potassium 3.4 mmol/L      Chloride 98 mmol/L      CO2 22.6 mmol/L      Calcium 8.7 mg/dL      BUN/Creatinine Ratio 20.5     Anion Gap 12.4 mmol/L      eGFR 100.2 mL/min/1.73     Narrative:      GFR Normal >60  Chronic Kidney Disease <60  Kidney Failure <15      C-reactive Protein [[id number]]  (Abnormal) Collected: 12/20/23 0558    Specimen: Blood Updated: 12/20/23 0716     C-Reactive Protein 25.86 mg/dL     CBC & Differential [[id number]]  (Abnormal) Collected: 12/20/23 0558    Specimen: Blood Updated: 12/20/23 0707    Narrative:      The following orders were created for panel order CBC & Differential.  Procedure                               Abnormality         Status                     ---------                               -----------         ------                     CBC Auto Differential[[id number]]        Abnormal            Final result                 Please view results for these tests on the individual orders.    CBC Auto Differential [[id number]]  (Abnormal) Collected: 12/20/23 0558    Specimen: Blood Updated: 12/20/23 0707     WBC 25.39 10*3/mm3      RBC 3.98 10*6/mm3      Hemoglobin 12.1 g/dL      Hematocrit 35.4 %      MCV 88.9 fL      MCH 30.4 pg      MCHC 34.2 g/dL      RDW 11.4 %      RDW-SD 36.3 fl      MPV 10.3 fL      Platelets 511 10*3/mm3      Neutrophil % 87.2 %      Lymphocyte % 5.4 %      Monocyte % 5.5 %      Eosinophil % 0.0 %      Basophil % 0.2 %      Immature Grans % 1.7 %       Neutrophils, Absolute 22.13 10*3/mm3      Lymphocytes, Absolute 1.36 10*3/mm3      Monocytes, Absolute 1.39 10*3/mm3      Eosinophils, Absolute 0.01 10*3/mm3      Basophils, Absolute 0.06 10*3/mm3      Immature Grans, Absolute 0.44 10*3/mm3      nRBC 0.0 /100 WBC     Blood Culture - Blood, Arm, Left [[id number]] Collected: 12/20/23 0558    Specimen: Blood from Arm, Left Updated: 12/20/23 0633    Blood Culture - Blood, Arm, Right [[id number]] Collected: 12/20/23 0601    Specimen: Blood from Arm, Right Updated: 12/20/23 0633    Legionella Antigen, Urine - Urine, Urine, Clean Catch [[id number]]  (Normal) Collected: 12/19/23 0409    Specimen: Urine, Clean Catch Updated: 12/19/23 1633     LEGIONELLA ANTIGEN, URINE Negative    S. Pneumo Ag Urine or CSF - Urine, Urine, Clean Catch [[id number]]  (Abnormal) Collected: 12/19/23 0409    Specimen: Urine, Clean Catch Updated: 12/19/23 1632     Strep Pneumo Ag Positive    Blood Culture ID, PCR - Blood, Arm, Right [[id number]]  (Abnormal) Collected: 12/18/23 2359    Specimen: Blood from Arm, Right Updated: 12/19/23 1434     BCID, PCR Streptococcus pneumoniae. Identification by BCID2 PCR.     BOTTLE TYPE Aerobic Bottle    Blood Culture - Blood, Arm, Left [[id number]]  (Abnormal) Collected: 12/19/23 0011    Specimen: Blood from Arm, Left Updated: 12/19/23 1322     Blood Culture Abnormal Stain     Gram Stain Aerobic Bottle Gram positive cocci in pairs    Blood Culture - Blood, Arm, Right [[id number]]  (Abnormal) Collected: 12/18/23 2359    Specimen: Blood from Arm, Right Updated: 12/19/23 1321     Blood Culture Abnormal Stain     Gram Stain Aerobic Bottle Gram positive cocci in pairs      Anaerobic Bottle Gram positive cocci in pairs    Lactic Acid, Plasma [[id number]]  (Normal) Collected: 12/19/23 0457    Specimen: Blood Updated: 12/19/23 0624     Lactate 1.2 mmol/L     Basic Metabolic Panel [[id number]]  (Abnormal) Collected: 12/19/23 0457    Specimen: Blood Updated: 12/19/23 0671      Glucose 204 mg/dL      BUN 14 mg/dL      Creatinine 0.73 mg/dL      Sodium 136 mmol/L      Potassium 3.6 mmol/L      Chloride 100 mmol/L      CO2 22.2 mmol/L      Calcium 7.9 mg/dL      BUN/Creatinine Ratio 19.2     Anion Gap 13.8 mmol/L      eGFR 104.2 mL/min/1.73     Narrative:      GFR Normal >60  Chronic Kidney Disease <60  Kidney Failure <15      Protime-INR [[id number]]  (Abnormal) Collected: 12/19/23 0456    Specimen: Blood Updated: 12/19/23 0618     Protime 14.9 Seconds      INR 1.16    CBC Auto Differential [[id number]]  (Abnormal) Collected: 12/19/23 0457    Specimen: Blood Updated: 12/19/23 0608     WBC 19.98 10*3/mm3      RBC 4.16 10*6/mm3      Hemoglobin 12.0 g/dL      Hematocrit 36.2 %      MCV 87.0 fL      MCH 28.8 pg      MCHC 33.1 g/dL      RDW 11.3 %      RDW-SD 35.4 fl      MPV 10.3 fL      Platelets 398 10*3/mm3      Neutrophil % 93.5 %      Lymphocyte % 2.0 %      Monocyte % 3.0 %      Eosinophil % 0.1 %      Basophil % 0.3 %      Immature Grans % 1.1 %      Neutrophils, Absolute 18.73 10*3/mm3      Lymphocytes, Absolute 0.39 10*3/mm3      Monocytes, Absolute 0.59 10*3/mm3      Eosinophils, Absolute 0.01 10*3/mm3      Basophils, Absolute 0.05 10*3/mm3      Immature Grans, Absolute 0.21 10*3/mm3      nRBC 0.0 /100 WBC     Urine Drug Screen - Urine, Clean Catch [[id number]]  (Abnormal) Collected: 12/19/23 0409    Specimen: Urine, Clean Catch Updated: 12/19/23 0502     Amphet/Methamphet, Screen Positive     Barbiturates Screen, Urine Negative     Benzodiazepine Screen, Urine Negative     Cocaine Screen, Urine Negative     Opiate Screen Positive     THC, Screen, Urine Negative     Methadone Screen, Urine Negative     Oxycodone Screen, Urine Negative     Fentanyl, Urine Negative    Narrative:      Negative Thresholds Per Drugs Screened:    Amphetamines                 500 ng/ml  Barbiturates                 200 ng/ml  Benzodiazepines              100 ng/ml  Cocaine                      300  ng/ml  Methadone                    300 ng/ml  Opiates                      300 ng/ml  Oxycodone                    100 ng/ml  THC                           50 ng/ml  Fentanyl                       5 ng/ml      The Normal Value for all drugs tested is negative. This report includes final unconfirmed screening results to be used for medical treatment purposes only. Unconfirmed results must not be used for non-medical purposes such as employment or legal testing. Clinical consideration should be applied to any drug of abuse test, particularly when unconfirmed results are used.            Respiratory Panel PCR w/COVID-19(SARS-CoV-2) GINNY/ROLY/JOSE ANTONIO/PAD/COR/JEFFREY In-House, NP Swab in UTM/VTM, 2 HR TAT - Swab, Nasopharynx [[id number]]  (Normal) Collected: 12/19/23 0001    Specimen: Swab from Nasopharynx Updated: 12/19/23 0117     ADENOVIRUS, PCR Not Detected     Coronavirus 229E Not Detected     Coronavirus HKU1 Not Detected     Coronavirus NL63 Not Detected     Coronavirus OC43 Not Detected     COVID19 Not Detected     Human Metapneumovirus Not Detected     Human Rhinovirus/Enterovirus Not Detected     Influenza A PCR Not Detected     Influenza B PCR Not Detected     Parainfluenza Virus 1 Not Detected     Parainfluenza Virus 2 Not Detected     Parainfluenza Virus 3 Not Detected     Parainfluenza Virus 4 Not Detected     RSV, PCR Not Detected     Bordetella pertussis pcr Not Detected     Bordetella parapertussis PCR Not Detected     Chlamydophila pneumoniae PCR Not Detected     Mycoplasma pneumo by PCR Not Detected    Narrative:      In the setting of a positive respiratory panel with a viral infection PLUS a negative procalcitonin without other underlying concern for bacterial infection, consider observing off antibiotics or discontinuation of antibiotics and continue supportive care. If the respiratory panel is positive for atypical bacterial infection (Bordetella pertussis, Chlamydophila pneumoniae, or Mycoplasma  "pneumoniae), consider antibiotic de-escalation to target atypical bacterial infection.    Comprehensive Metabolic Panel [[id number]]  (Abnormal) Collected: 12/18/23 2356    Specimen: Blood Updated: 12/19/23 0100     Glucose 162 mg/dL      BUN 16 mg/dL      Creatinine 0.92 mg/dL      Sodium 135 mmol/L      Potassium 3.5 mmol/L      Chloride 99 mmol/L      CO2 22.0 mmol/L      Calcium 8.9 mg/dL      Total Protein 7.0 g/dL      Albumin 3.7 g/dL      ALT (SGPT) 31 U/L      AST (SGOT) 31 U/L      Alkaline Phosphatase 131 U/L      Total Bilirubin 0.3 mg/dL      Globulin 3.3 gm/dL      A/G Ratio 1.1 g/dL      BUN/Creatinine Ratio 17.4     Anion Gap 14.0 mmol/L      eGFR 95.2 mL/min/1.73     Narrative:      GFR Normal >60  Chronic Kidney Disease <60  Kidney Failure <15      Lactic Acid, Plasma [[id number]]  (Normal) Collected: 12/18/23 2356    Specimen: Blood Updated: 12/19/23 0057     Lactate 1.1 mmol/L     Procalcitonin [[id number]]  (Abnormal) Collected: 12/18/23 2356    Specimen: Blood Updated: 12/19/23 0039     Procalcitonin 5.45 ng/mL     Narrative:      As a Marker for Sepsis (Non-Neonates):    1. <0.5 ng/mL represents a low risk of severe sepsis and/or septic shock.  2. >2 ng/mL represents a high risk of severe sepsis and/or septic shock.    As a Marker for Lower Respiratory Tract Infections that require antibiotic therapy:    PCT on Admission    Antibiotic Therapy       6-12 Hrs later    >0.5                Strongly Recommended  >0.25 - <0.5        Recommended   0.1 - 0.25          Discouraged              Remeasure/reassess PCT  <0.1                Strongly Discouraged     Remeasure/reassess PCT    As 28 day mortality risk marker: \"Change in Procalcitonin Result\" (>80% or <=80%) if Day 0 (or Day 1) and Day 4 values are available. Refer to http://www.[employer]-pct-calculator.com    Change in PCT <=80%  A decrease of PCT levels below or equal to 80% defines a positive change in PCT test result representing a higher risk " for 28-day all-cause mortality of patients diagnosed with severe sepsis for septic shock.    Change in PCT >80%  A decrease of PCT levels of more than 80% defines a negative change in PCT result representing a lower risk for 28-day all-cause mortality of patients diagnosed with severe sepsis or septic shock.       Protime-INR [[id number]]  (Abnormal) Collected: 12/18/23 2356    Specimen: Blood Updated: 12/19/23 0022     Protime 15.0 Seconds      INR 1.16    aPTT [[id number]]  (Abnormal) Collected: 12/18/23 2356    Specimen: Blood Updated: 12/19/23 0022     PTT 36.7 seconds     CBC & Differential [[id number]]  (Abnormal) Collected: 12/18/23 2356    Specimen: Blood Updated: 12/19/23 0013    Narrative:      The following orders were created for panel order CBC & Differential.  Procedure                               Abnormality         Status                     ---------                               -----------         ------                     CBC Auto Differential[[id number]]        Abnormal            Final result                 Please view results for these tests on the individual orders.    CBC Auto Differential [[id number]]  (Abnormal) Collected: 12/18/23 2356    Specimen: Blood Updated: 12/19/23 0013     WBC 20.96 10*3/mm3      RBC 3.90 10*6/mm3      Hemoglobin 12.1 g/dL      Hematocrit 34.1 %      MCV 87.4 fL      MCH 31.0 pg      MCHC 35.5 g/dL      RDW 11.4 %      RDW-SD 35.7 fl      MPV 10.0 fL      Platelets 375 10*3/mm3      Neutrophil % 83.5 %      Lymphocyte % 7.5 %      Monocyte % 7.3 %      Eosinophil % 0.6 %      Basophil % 0.4 %      Immature Grans % 0.7 %      Neutrophils, Absolute 17.51 10*3/mm3      Lymphocytes, Absolute 1.57 10*3/mm3      Monocytes, Absolute 1.52 10*3/mm3      Eosinophils, Absolute 0.13 10*3/mm3      Basophils, Absolute 0.08 10*3/mm3      Immature Grans, Absolute 0.15 10*3/mm3      nRBC 0.0 /100 WBC           Data Review:  Results from last 7 days   Lab Units 12/20/23  0552  "12/19/23  0457 12/18/23  2356   SODIUM mmol/L 133* 136 135*   POTASSIUM mmol/L 3.4* 3.6 3.5   CHLORIDE mmol/L 98 100 99   CO2 mmol/L 22.6 22.2 22.0   BUN mg/dL 17 14 16   CREATININE mg/dL 0.83 0.73* 0.92   GLUCOSE mg/dL 268* 204* 162*   CALCIUM mg/dL 8.7 7.9* 8.9     Results from last 7 days   Lab Units 12/20/23  0558 12/19/23 0457 12/18/23  2356   WBC 10*3/mm3 25.39* 19.98* 20.96*   HEMOGLOBIN g/dL 12.1* 12.0* 12.1*   HEMATOCRIT % 35.4* 36.2* 34.1*   PLATELETS 10*3/mm3 511* 398 375             Lab Results   Lab Value Date/Time    TROPONINT <0.010 07/29/2022 1714         Results from last 7 days   Lab Units 12/18/23  2356   ALK PHOS U/L 131*   BILIRUBIN mg/dL 0.3   ALT (SGPT) U/L 31   AST (SGOT) U/L 31             No results found for: \"POCGLU\"  Results from last 7 days   Lab Units 12/19/23  0456 12/18/23  2356   INR  1.16* 1.16*       History reviewed. No pertinent past medical history.    Assessment:  Active Hospital Problems    Diagnosis  POA    **Pneumonia [J18.9]  Yes    Neck pain [M54.2]  Unknown    Leukocytosis [D72.829]  Unknown    Pneumonia due to infectious organism [J18.9]  Unknown      Resolved Hospital Problems   No resolved problems to display.       Plan:  Continue with antibiotics as recommended per infectious disease.  Will ask for neurosurgery consult in regard to his neck pain.  C-spine MRI showing some significant degenerative disc disease.  Pain control and follow-up on lab.    Edgardo Núñez MD  12/20/2023  13:04 EST    "

## 2023-12-20 NOTE — PLAN OF CARE
Goal Outcome Evaluation:         Pt return from MRI, up in room. Medicated for pain per MAR. No other c/o voiced. VSS No s/s of distress

## 2023-12-20 NOTE — NURSING NOTE
Pt became agitated tore apart the monitor from the wires and the pulse ox apart, threw them on the floor and said he wasn't wearing them anymore. CTU Notified. Will see if he will put them on at a later time.

## 2023-12-20 NOTE — PROGRESS NOTES
ID note for sepsis  S: He had some neck spasms today which are improved.  His respiratory symptoms are better.  Afebrile.  Tolerating antibiotic      Physical Exam:   Vital Signs   Temp:  [97.9 °F (36.6 °C)-98.2 °F (36.8 °C)] 97.9 °F (36.6 °C)  Heart Rate:  [85-95] 95  Resp:  [16] 16  BP: (141-155)/() 155/89    GENERAL: Awake and alert, in no acute distress.   HEENT: Oropharynx is clear. Hearing is grossly normal.   EYES: . No conjunctival injection. No lid lag.   LUNGS:normal respiratory effort.   SKIN: no cutaneous eruptions in exposed areas  PSYCHIATRIC: Appropriate mood, affect, insight, and judgment.     Results Review:  WBC 25  Cr 0.83      Microbiology:  12/18 Bcx 2/2 pneumococcus  12/19 RPP neg  12/20 blood cultures pending       A/p  1.  Streptococcal pneumoniae septicemia secondary #2  2.  Left lower lobe pneumococcal pneumonia    White count higher but also received steroids.  Continue ceftriaxone 2 g IV every 24 hours.  Check TTE.  No evidence of discitis on MRI of the cervical spine    Repeat CBC in the morning.  If white count continues to increase will need to check repeat chest imaging to make sure no complication of pneumonia such as empyema or abscess

## 2023-12-21 ENCOUNTER — APPOINTMENT (OUTPATIENT)
Dept: CT IMAGING | Facility: HOSPITAL | Age: 63
End: 2023-12-21
Payer: MEDICAID

## 2023-12-21 LAB
ANION GAP SERPL CALCULATED.3IONS-SCNC: 11.2 MMOL/L (ref 5–15)
BACTERIA SPEC AEROBE CULT: ABNORMAL
BACTERIA SPEC AEROBE CULT: ABNORMAL
BACTERIA SPEC RESP CULT: NORMAL
BASOPHILS # BLD AUTO: 0.06 10*3/MM3 (ref 0–0.2)
BASOPHILS NFR BLD AUTO: 0.3 % (ref 0–1.5)
BUN SERPL-MCNC: 11 MG/DL (ref 8–23)
BUN/CREAT SERPL: 18 (ref 7–25)
CALCIUM SPEC-SCNC: 8.5 MG/DL (ref 8.6–10.5)
CHLORIDE SERPL-SCNC: 97 MMOL/L (ref 98–107)
CO2 SERPL-SCNC: 23.8 MMOL/L (ref 22–29)
CREAT SERPL-MCNC: 0.61 MG/DL (ref 0.76–1.27)
DEPRECATED RDW RBC AUTO: 36 FL (ref 37–54)
EGFRCR SERPLBLD CKD-EPI 2021: 110 ML/MIN/1.73
EOSINOPHIL # BLD AUTO: 0.06 10*3/MM3 (ref 0–0.4)
EOSINOPHIL NFR BLD AUTO: 0.3 % (ref 0.3–6.2)
ERYTHROCYTE [DISTWIDTH] IN BLOOD BY AUTOMATED COUNT: 11.3 % (ref 12.3–15.4)
GLUCOSE SERPL-MCNC: 137 MG/DL (ref 65–99)
GRAM STN SPEC: ABNORMAL
GRAM STN SPEC: NORMAL
HCT VFR BLD AUTO: 34.8 % (ref 37.5–51)
HGB BLD-MCNC: 11.5 G/DL (ref 13–17.7)
IMM GRANULOCYTES # BLD AUTO: 0.25 10*3/MM3 (ref 0–0.05)
IMM GRANULOCYTES NFR BLD AUTO: 1.3 % (ref 0–0.5)
ISOLATED FROM: ABNORMAL
ISOLATED FROM: ABNORMAL
LYMPHOCYTES # BLD AUTO: 1.75 10*3/MM3 (ref 0.7–3.1)
LYMPHOCYTES NFR BLD AUTO: 8.9 % (ref 19.6–45.3)
MCH RBC QN AUTO: 28.9 PG (ref 26.6–33)
MCHC RBC AUTO-ENTMCNC: 33 G/DL (ref 31.5–35.7)
MCV RBC AUTO: 87.4 FL (ref 79–97)
MONOCYTES # BLD AUTO: 1.36 10*3/MM3 (ref 0.1–0.9)
MONOCYTES NFR BLD AUTO: 6.9 % (ref 5–12)
NEUTROPHILS NFR BLD AUTO: 16.2 10*3/MM3 (ref 1.7–7)
NEUTROPHILS NFR BLD AUTO: 82.3 % (ref 42.7–76)
NRBC BLD AUTO-RTO: 0.1 /100 WBC (ref 0–0.2)
PLATELET # BLD AUTO: 483 10*3/MM3 (ref 140–450)
PMV BLD AUTO: 9.9 FL (ref 6–12)
POTASSIUM SERPL-SCNC: 3.1 MMOL/L (ref 3.5–5.2)
RBC # BLD AUTO: 3.98 10*6/MM3 (ref 4.14–5.8)
SODIUM SERPL-SCNC: 132 MMOL/L (ref 136–145)
WBC NRBC COR # BLD AUTO: 19.68 10*3/MM3 (ref 3.4–10.8)

## 2023-12-21 PROCEDURE — 85025 COMPLETE CBC W/AUTO DIFF WBC: CPT | Performed by: HOSPITALIST

## 2023-12-21 PROCEDURE — 25010000002 HALOPERIDOL LACTATE PER 5 MG: Performed by: NURSE PRACTITIONER

## 2023-12-21 PROCEDURE — 71250 CT THORAX DX C-: CPT

## 2023-12-21 PROCEDURE — 80048 BASIC METABOLIC PNL TOTAL CA: CPT | Performed by: HOSPITALIST

## 2023-12-21 PROCEDURE — 25010000002 HYDROMORPHONE PER 4 MG: Performed by: NURSE PRACTITIONER

## 2023-12-21 PROCEDURE — 99232 SBSQ HOSP IP/OBS MODERATE 35: CPT | Performed by: INTERNAL MEDICINE

## 2023-12-21 PROCEDURE — 25010000002 LORAZEPAM PER 2 MG: Performed by: NURSE PRACTITIONER

## 2023-12-21 PROCEDURE — 25810000003 SODIUM CHLORIDE 0.9 % SOLUTION: Performed by: NURSE PRACTITIONER

## 2023-12-21 RX ORDER — HALOPERIDOL 5 MG/ML
2 INJECTION INTRAMUSCULAR ONCE
Status: COMPLETED | OUTPATIENT
Start: 2023-12-21 | End: 2023-12-21

## 2023-12-21 RX ADMIN — Medication 10 ML: at 09:17

## 2023-12-21 RX ADMIN — OXYCODONE AND ACETAMINOPHEN 2 TABLET: 5; 325 TABLET ORAL at 21:47

## 2023-12-21 RX ADMIN — SENNOSIDES AND DOCUSATE SODIUM 2 TABLET: 50; 8.6 TABLET ORAL at 09:17

## 2023-12-21 RX ADMIN — CYCLOBENZAPRINE 10 MG: 10 TABLET, FILM COATED ORAL at 09:28

## 2023-12-21 RX ADMIN — SODIUM CHLORIDE 100 ML/HR: 9 INJECTION, SOLUTION INTRAVENOUS at 10:57

## 2023-12-21 RX ADMIN — HALOPERIDOL LACTATE 2 MG: 5 INJECTION, SOLUTION INTRAMUSCULAR at 03:35

## 2023-12-21 RX ADMIN — OXYCODONE AND ACETAMINOPHEN 2 TABLET: 5; 325 TABLET ORAL at 15:19

## 2023-12-21 RX ADMIN — HYDROMORPHONE HYDROCHLORIDE 0.5 MG: 1 INJECTION, SOLUTION INTRAMUSCULAR; INTRAVENOUS; SUBCUTANEOUS at 04:25

## 2023-12-21 RX ADMIN — LORAZEPAM 0.5 MG: 2 INJECTION INTRAMUSCULAR; INTRAVENOUS at 00:49

## 2023-12-21 RX ADMIN — OXYCODONE AND ACETAMINOPHEN 2 TABLET: 5; 325 TABLET ORAL at 09:28

## 2023-12-21 NOTE — PROGRESS NOTES
ID note for sepsis  S: He became agitated and confused requiring restraints    Physical Exam:   Vital Signs   Temp:  [97.9 °F (36.6 °C)-100 °F (37.8 °C)] 97.9 °F (36.6 °C)  Heart Rate:  [] 72  Resp:  [16-18] 18  BP: (140-163)/(84-94) 157/93    GENERAL: Awake and alert, ill-appearing  HEENT: Oropharynx is clear. Hearing is grossly normal.   EYES: . No conjunctival injection. No lid lag.   LUNGS:normal respiratory effort.   SKIN: no cutaneous eruptions in exposed areas  PSYCHIATRIC: Confused.  Cooperative today.    Results Review:  White count 19.68  Creatinine 0.61      Microbiology:  12/18 Bcx 2/2 pneumococcus  12/19 RPP neg  12/20 blood cultures no growth to date       A/p  1.  Streptococcal pneumoniae septicemia secondary #2  2.  Left lower lobe pneumococcal pneumonia  3.  Encephalopathy, question methamphetamine withdrawal    He became agitated and confused.  White count remains elevated.  He was on 4 L but we are weaning this down.  We will continue ceftriaxone.  I do wonder if some of his mental status changes might be related to methamphetamine withdrawal.  TTE was okay without clear evidence of endocarditis.  I will check a CT of the chest to look for any complications of pneumonia such as pleural effusion or empyema

## 2023-12-21 NOTE — PROGRESS NOTES
"DAILY PROGRESS NOTE  [facility]    Patient Identification:  Name: Markos Virk  Age: 62 y.o.  Sex: male  :  1960  MRN: [id number]         Primary Care Physician: Provider, No Known    Subjective:  Interval History: He complains of neck pain.    Objective:    Scheduled Meds:cefTRIAXone, 2,000 mg, Intravenous, Q24H  senna-docusate sodium, 2 tablet, Oral, BID  sodium chloride, 10 mL, Intravenous, Q12H      Continuous Infusions:sodium chloride, 100 mL/hr, Last Rate: 100 mL/hr (23 1057)        Vital signs in last 24 hours:  Temp:  [97.9 °F (36.6 °C)-100 °F (37.8 °C)] 98.1 °F (36.7 °C)  Heart Rate:  [] 91  Resp:  [16-18] 18  BP: (106-163)/(75-94) 106/75    Intake/Output:    Intake/Output Summary (Last 24 hours) at 2023 1503  Last data filed at 2023 1057  Gross per 24 hour   Intake 1120 ml   Output --   Net 1120 ml       Exam:  /75 (BP Location: Left arm, Patient Position: Lying)   Pulse 91   Temp 98.1 °F (36.7 °C) (Axillary)   Resp 18   Ht 172.7 cm (68\")   Wt 69.4 kg (153 lb)   SpO2 97%   BMI 23.26 kg/m²     General Appearance:    Alert, cooperative, no distress   Head:    Normocephalic, without obvious abnormality, atraumatic   Eyes:       Throat:   Lips, tongue, gums normal   Neck:   Supple, symmetrical, trachea midline, no JVD   Lungs:     Clear to auscultation bilaterally, respirations unlabored   Chest Wall:    No tenderness or deformity    Heart:    Regular rate and rhythm, S1 and S2 normal, no murmur,no  Rub or gallop   Abdomen:     Soft, nontender, bowel sounds active, no masses, no organomegaly    Extremities:   Extremities normal, atraumatic, no cyanosis or edema   Pulses:      Skin:   Skin is warm and dry,  no rashes or palpable lesions   Neurologic:   no focal deficits noted      Lab Results (last 72 hours)       Procedure Component Value Units Date/Time    Respiratory Culture - Sputum, Cough [[id number]] Collected: 23 0945    Specimen: " Sputum from Cough Updated: 12/20/23 0954     Respiratory Culture Scant growth (1+) The culture consists of normal respiratory kyle. This is a preliminary report; final report to follow.     Gram Stain Moderate (3+) WBCs seen      Rare (1+) Epithelial cells per low power field      Rare (1+) Mixed bacterial kyle    Basic Metabolic Panel [[id number]]  (Abnormal) Collected: 12/20/23 0558    Specimen: Blood Updated: 12/20/23 0716     Glucose 268 mg/dL      BUN 17 mg/dL      Creatinine 0.83 mg/dL      Sodium 133 mmol/L      Potassium 3.4 mmol/L      Chloride 98 mmol/L      CO2 22.6 mmol/L      Calcium 8.7 mg/dL      BUN/Creatinine Ratio 20.5     Anion Gap 12.4 mmol/L      eGFR 100.2 mL/min/1.73     Narrative:      GFR Normal >60  Chronic Kidney Disease <60  Kidney Failure <15      C-reactive Protein [[id number]]  (Abnormal) Collected: 12/20/23 0558    Specimen: Blood Updated: 12/20/23 0716     C-Reactive Protein 25.86 mg/dL     CBC & Differential [[id number]]  (Abnormal) Collected: 12/20/23 0558    Specimen: Blood Updated: 12/20/23 0707    Narrative:      The following orders were created for panel order CBC & Differential.  Procedure                               Abnormality         Status                     ---------                               -----------         ------                     CBC Auto Differential[[id number]]        Abnormal            Final result                 Please view results for these tests on the individual orders.    CBC Auto Differential [[id number]]  (Abnormal) Collected: 12/20/23 0558    Specimen: Blood Updated: 12/20/23 0707     WBC 25.39 10*3/mm3      RBC 3.98 10*6/mm3      Hemoglobin 12.1 g/dL      Hematocrit 35.4 %      MCV 88.9 fL      MCH 30.4 pg      MCHC 34.2 g/dL      RDW 11.4 %      RDW-SD 36.3 fl      MPV 10.3 fL      Platelets 511 10*3/mm3      Neutrophil % 87.2 %      Lymphocyte % 5.4 %      Monocyte % 5.5 %      Eosinophil % 0.0 %      Basophil % 0.2 %      Immature Grans %  1.7 %      Neutrophils, Absolute 22.13 10*3/mm3      Lymphocytes, Absolute 1.36 10*3/mm3      Monocytes, Absolute 1.39 10*3/mm3      Eosinophils, Absolute 0.01 10*3/mm3      Basophils, Absolute 0.06 10*3/mm3      Immature Grans, Absolute 0.44 10*3/mm3      nRBC 0.0 /100 WBC     Blood Culture - Blood, Arm, Left [[id number]] Collected: 12/20/23 0558    Specimen: Blood from Arm, Left Updated: 12/20/23 0633    Blood Culture - Blood, Arm, Right [[id number]] Collected: 12/20/23 0601    Specimen: Blood from Arm, Right Updated: 12/20/23 0633    Legionella Antigen, Urine - Urine, Urine, Clean Catch [[id number]]  (Normal) Collected: 12/19/23 0409    Specimen: Urine, Clean Catch Updated: 12/19/23 1633     LEGIONELLA ANTIGEN, URINE Negative    S. Pneumo Ag Urine or CSF - Urine, Urine, Clean Catch [[id number]]  (Abnormal) Collected: 12/19/23 0409    Specimen: Urine, Clean Catch Updated: 12/19/23 1632     Strep Pneumo Ag Positive    Blood Culture ID, PCR - Blood, Arm, Right [[id number]]  (Abnormal) Collected: 12/18/23 2359    Specimen: Blood from Arm, Right Updated: 12/19/23 1434     BCID, PCR Streptococcus pneumoniae. Identification by BCID2 PCR.     BOTTLE TYPE Aerobic Bottle    Blood Culture - Blood, Arm, Left [[id number]]  (Abnormal) Collected: 12/19/23 0011    Specimen: Blood from Arm, Left Updated: 12/19/23 1322     Blood Culture Abnormal Stain     Gram Stain Aerobic Bottle Gram positive cocci in pairs    Blood Culture - Blood, Arm, Right [[id number]]  (Abnormal) Collected: 12/18/23 2359    Specimen: Blood from Arm, Right Updated: 12/19/23 1321     Blood Culture Abnormal Stain     Gram Stain Aerobic Bottle Gram positive cocci in pairs      Anaerobic Bottle Gram positive cocci in pairs    Lactic Acid, Plasma [[id number]]  (Normal) Collected: 12/19/23 0457    Specimen: Blood Updated: 12/19/23 0624     Lactate 1.2 mmol/L     Basic Metabolic Panel [[id number]]  (Abnormal) Collected: 12/19/23 0457    Specimen: Blood Updated: 12/19/23  0624     Glucose 204 mg/dL      BUN 14 mg/dL      Creatinine 0.73 mg/dL      Sodium 136 mmol/L      Potassium 3.6 mmol/L      Chloride 100 mmol/L      CO2 22.2 mmol/L      Calcium 7.9 mg/dL      BUN/Creatinine Ratio 19.2     Anion Gap 13.8 mmol/L      eGFR 104.2 mL/min/1.73     Narrative:      GFR Normal >60  Chronic Kidney Disease <60  Kidney Failure <15      Protime-INR [[id number]]  (Abnormal) Collected: 12/19/23 0456    Specimen: Blood Updated: 12/19/23 0618     Protime 14.9 Seconds      INR 1.16    CBC Auto Differential [[id number]]  (Abnormal) Collected: 12/19/23 0457    Specimen: Blood Updated: 12/19/23 0608     WBC 19.98 10*3/mm3      RBC 4.16 10*6/mm3      Hemoglobin 12.0 g/dL      Hematocrit 36.2 %      MCV 87.0 fL      MCH 28.8 pg      MCHC 33.1 g/dL      RDW 11.3 %      RDW-SD 35.4 fl      MPV 10.3 fL      Platelets 398 10*3/mm3      Neutrophil % 93.5 %      Lymphocyte % 2.0 %      Monocyte % 3.0 %      Eosinophil % 0.1 %      Basophil % 0.3 %      Immature Grans % 1.1 %      Neutrophils, Absolute 18.73 10*3/mm3      Lymphocytes, Absolute 0.39 10*3/mm3      Monocytes, Absolute 0.59 10*3/mm3      Eosinophils, Absolute 0.01 10*3/mm3      Basophils, Absolute 0.05 10*3/mm3      Immature Grans, Absolute 0.21 10*3/mm3      nRBC 0.0 /100 WBC     Urine Drug Screen - Urine, Clean Catch [[id number]]  (Abnormal) Collected: 12/19/23 0409    Specimen: Urine, Clean Catch Updated: 12/19/23 0502     Amphet/Methamphet, Screen Positive     Barbiturates Screen, Urine Negative     Benzodiazepine Screen, Urine Negative     Cocaine Screen, Urine Negative     Opiate Screen Positive     THC, Screen, Urine Negative     Methadone Screen, Urine Negative     Oxycodone Screen, Urine Negative     Fentanyl, Urine Negative    Narrative:      Negative Thresholds Per Drugs Screened:    Amphetamines                 500 ng/ml  Barbiturates                 200 ng/ml  Benzodiazepines              100 ng/ml  Cocaine                      300  ng/ml  Methadone                    300 ng/ml  Opiates                      300 ng/ml  Oxycodone                    100 ng/ml  THC                           50 ng/ml  Fentanyl                       5 ng/ml      The Normal Value for all drugs tested is negative. This report includes final unconfirmed screening results to be used for medical treatment purposes only. Unconfirmed results must not be used for non-medical purposes such as employment or legal testing. Clinical consideration should be applied to any drug of abuse test, particularly when unconfirmed results are used.            Respiratory Panel PCR w/COVID-19(SARS-CoV-2) GINNY/ROLY/JOSE ANTONIO/PAD/COR/JEFFREY In-House, NP Swab in UTM/VTM, 2 HR TAT - Swab, Nasopharynx [[id number]]  (Normal) Collected: 12/19/23 0001    Specimen: Swab from Nasopharynx Updated: 12/19/23 0117     ADENOVIRUS, PCR Not Detected     Coronavirus 229E Not Detected     Coronavirus HKU1 Not Detected     Coronavirus NL63 Not Detected     Coronavirus OC43 Not Detected     COVID19 Not Detected     Human Metapneumovirus Not Detected     Human Rhinovirus/Enterovirus Not Detected     Influenza A PCR Not Detected     Influenza B PCR Not Detected     Parainfluenza Virus 1 Not Detected     Parainfluenza Virus 2 Not Detected     Parainfluenza Virus 3 Not Detected     Parainfluenza Virus 4 Not Detected     RSV, PCR Not Detected     Bordetella pertussis pcr Not Detected     Bordetella parapertussis PCR Not Detected     Chlamydophila pneumoniae PCR Not Detected     Mycoplasma pneumo by PCR Not Detected    Narrative:      In the setting of a positive respiratory panel with a viral infection PLUS a negative procalcitonin without other underlying concern for bacterial infection, consider observing off antibiotics or discontinuation of antibiotics and continue supportive care. If the respiratory panel is positive for atypical bacterial infection (Bordetella pertussis, Chlamydophila pneumoniae, or Mycoplasma  "pneumoniae), consider antibiotic de-escalation to target atypical bacterial infection.    Comprehensive Metabolic Panel [[id number]]  (Abnormal) Collected: 12/18/23 2356    Specimen: Blood Updated: 12/19/23 0100     Glucose 162 mg/dL      BUN 16 mg/dL      Creatinine 0.92 mg/dL      Sodium 135 mmol/L      Potassium 3.5 mmol/L      Chloride 99 mmol/L      CO2 22.0 mmol/L      Calcium 8.9 mg/dL      Total Protein 7.0 g/dL      Albumin 3.7 g/dL      ALT (SGPT) 31 U/L      AST (SGOT) 31 U/L      Alkaline Phosphatase 131 U/L      Total Bilirubin 0.3 mg/dL      Globulin 3.3 gm/dL      A/G Ratio 1.1 g/dL      BUN/Creatinine Ratio 17.4     Anion Gap 14.0 mmol/L      eGFR 95.2 mL/min/1.73     Narrative:      GFR Normal >60  Chronic Kidney Disease <60  Kidney Failure <15      Lactic Acid, Plasma [[id number]]  (Normal) Collected: 12/18/23 2356    Specimen: Blood Updated: 12/19/23 0057     Lactate 1.1 mmol/L     Procalcitonin [[id number]]  (Abnormal) Collected: 12/18/23 2356    Specimen: Blood Updated: 12/19/23 0039     Procalcitonin 5.45 ng/mL     Narrative:      As a Marker for Sepsis (Non-Neonates):    1. <0.5 ng/mL represents a low risk of severe sepsis and/or septic shock.  2. >2 ng/mL represents a high risk of severe sepsis and/or septic shock.    As a Marker for Lower Respiratory Tract Infections that require antibiotic therapy:    PCT on Admission    Antibiotic Therapy       6-12 Hrs later    >0.5                Strongly Recommended  >0.25 - <0.5        Recommended   0.1 - 0.25          Discouraged              Remeasure/reassess PCT  <0.1                Strongly Discouraged     Remeasure/reassess PCT    As 28 day mortality risk marker: \"Change in Procalcitonin Result\" (>80% or <=80%) if Day 0 (or Day 1) and Day 4 values are available. Refer to http://www.[employer]-pct-calculator.com    Change in PCT <=80%  A decrease of PCT levels below or equal to 80% defines a positive change in PCT test result representing a higher risk " for 28-day all-cause mortality of patients diagnosed with severe sepsis for septic shock.    Change in PCT >80%  A decrease of PCT levels of more than 80% defines a negative change in PCT result representing a lower risk for 28-day all-cause mortality of patients diagnosed with severe sepsis or septic shock.       Protime-INR [[id number]]  (Abnormal) Collected: 12/18/23 2356    Specimen: Blood Updated: 12/19/23 0022     Protime 15.0 Seconds      INR 1.16    aPTT [[id number]]  (Abnormal) Collected: 12/18/23 2356    Specimen: Blood Updated: 12/19/23 0022     PTT 36.7 seconds     CBC & Differential [[id number]]  (Abnormal) Collected: 12/18/23 2356    Specimen: Blood Updated: 12/19/23 0013    Narrative:      The following orders were created for panel order CBC & Differential.  Procedure                               Abnormality         Status                     ---------                               -----------         ------                     CBC Auto Differential[[id number]]        Abnormal            Final result                 Please view results for these tests on the individual orders.    CBC Auto Differential [[id number]]  (Abnormal) Collected: 12/18/23 2356    Specimen: Blood Updated: 12/19/23 0013     WBC 20.96 10*3/mm3      RBC 3.90 10*6/mm3      Hemoglobin 12.1 g/dL      Hematocrit 34.1 %      MCV 87.4 fL      MCH 31.0 pg      MCHC 35.5 g/dL      RDW 11.4 %      RDW-SD 35.7 fl      MPV 10.0 fL      Platelets 375 10*3/mm3      Neutrophil % 83.5 %      Lymphocyte % 7.5 %      Monocyte % 7.3 %      Eosinophil % 0.6 %      Basophil % 0.4 %      Immature Grans % 0.7 %      Neutrophils, Absolute 17.51 10*3/mm3      Lymphocytes, Absolute 1.57 10*3/mm3      Monocytes, Absolute 1.52 10*3/mm3      Eosinophils, Absolute 0.13 10*3/mm3      Basophils, Absolute 0.08 10*3/mm3      Immature Grans, Absolute 0.15 10*3/mm3      nRBC 0.0 /100 WBC           Data Review:  Results from last 7 days   Lab Units 12/21/23  0695  "12/20/23  0558 12/19/23  0457   SODIUM mmol/L 132* 133* 136   POTASSIUM mmol/L 3.1* 3.4* 3.6   CHLORIDE mmol/L 97* 98 100   CO2 mmol/L 23.8 22.6 22.2   BUN mg/dL 11 17 14   CREATININE mg/dL 0.61* 0.83 0.73*   GLUCOSE mg/dL 137* 268* 204*   CALCIUM mg/dL 8.5* 8.7 7.9*     Results from last 7 days   Lab Units 12/21/23  0657 12/20/23  0558 12/19/23  0457   WBC 10*3/mm3 19.68* 25.39* 19.98*   HEMOGLOBIN g/dL 11.5* 12.1* 12.0*   HEMATOCRIT % 34.8* 35.4* 36.2*   PLATELETS 10*3/mm3 483* 511* 398             Lab Results   Lab Value Date/Time    TROPONINT <0.010 07/29/2022 1714         Results from last 7 days   Lab Units 12/18/23  2356   ALK PHOS U/L 131*   BILIRUBIN mg/dL 0.3   ALT (SGPT) U/L 31   AST (SGOT) U/L 31             No results found for: \"POCGLU\"  Results from last 7 days   Lab Units 12/19/23  0456 12/18/23  2356   INR  1.16* 1.16*       History reviewed. No pertinent past medical history.    Assessment:  Active Hospital Problems    Diagnosis  POA    **Pneumonia [J18.9]  Yes    Neck pain [M54.2]  Unknown    Leukocytosis [D72.829]  Unknown    Pneumonia due to infectious organism [J18.9]  Unknown      Resolved Hospital Problems   No resolved problems to display.       Plan:  Continue with antibiotics as recommended per infectious disease.  Neurosurgery consult noted.  C-spine MRI showing some significant degenerative disc disease.  Pain control and follow-up on lab.  Await results of follow-up CT of chest.    Edgardo Núñez MD  12/21/2023  15:03 EST    "

## 2023-12-21 NOTE — PLAN OF CARE
Goal Outcome Evaluation:                      Patient alert and oriented with constant c/o neck pain- medicated per MAR. Warm blanket wrapped around neck is helpful with pain control. Restraints removed at 0927- patient has been calm, cooperative, and pleasant this shift. IV fluids infusing. Patient calling for assistance when needed. No acute distress noted, will continue to monitor.

## 2023-12-21 NOTE — CASE MANAGEMENT/SOCIAL WORK
Continued Stay Note  [facility]     Patient Name: Markos Virk  MRN: [id number]  Today's Date: 12/21/2023    Admit Date: 12/18/2023    Plan: Plan home with a friend.  CHELY Kevin RN   Discharge Plan       Row Name 12/21/23 6024       Plan    Plan Plan home with a friend.  CHELY Kevin RN    Patient/Family in Agreement with Plan yes    Plan Comments Spoke with pt at bedside.  Pt denies any discharge needs.  Pt states his friend will assist him at home if needed and transport him home.  Plan home with friend.  CHELY Kevin RN                   Discharge Codes    No documentation.                 Expected Discharge Date and Time       Expected Discharge Date Expected Discharge Time    Dec 24, 2023               Adele Kevin RN

## 2023-12-21 NOTE — PLAN OF CARE
Goal Outcome Evaluation:     Patient sleeping now after multiple attempts to wander around the unit and off the unit. He had been redirected many times since the change of shift last evening. Patient pulled out at least x3 IV sites, continues to remove his heart and oxygen monitors, and becomes aggressive if he is not allowed to do what he wants. VSS, although he did c/o some congestion and that it was making it difficult for him to breath. Oxygen placed at 2 ltrs, bilateral soft wrist restraints placed due to patient's behaviors. He then was placed also on a posey vest due to him standing in the bed with just the soft wrist restraints on. Nursing continues to monitor him closely for safety and follow him with a COWS scale due to his history of drug abuse.

## 2023-12-22 LAB
ANION GAP SERPL CALCULATED.3IONS-SCNC: 11.2 MMOL/L (ref 5–15)
BASOPHILS # BLD AUTO: 0.06 10*3/MM3 (ref 0–0.2)
BASOPHILS NFR BLD AUTO: 0.3 % (ref 0–1.5)
BUN SERPL-MCNC: 10 MG/DL (ref 8–23)
BUN/CREAT SERPL: 15.9 (ref 7–25)
CALCIUM SPEC-SCNC: 8.1 MG/DL (ref 8.6–10.5)
CHLORIDE SERPL-SCNC: 99 MMOL/L (ref 98–107)
CO2 SERPL-SCNC: 25.8 MMOL/L (ref 22–29)
CREAT SERPL-MCNC: 0.63 MG/DL (ref 0.76–1.27)
DEPRECATED RDW RBC AUTO: 36.4 FL (ref 37–54)
EGFRCR SERPLBLD CKD-EPI 2021: 108.9 ML/MIN/1.73
EOSINOPHIL # BLD AUTO: 0.5 10*3/MM3 (ref 0–0.4)
EOSINOPHIL NFR BLD AUTO: 2.8 % (ref 0.3–6.2)
ERYTHROCYTE [DISTWIDTH] IN BLOOD BY AUTOMATED COUNT: 11.7 % (ref 12.3–15.4)
GLUCOSE SERPL-MCNC: 124 MG/DL (ref 65–99)
HCT VFR BLD AUTO: 30.8 % (ref 37.5–51)
HGB BLD-MCNC: 10.6 G/DL (ref 13–17.7)
IMM GRANULOCYTES # BLD AUTO: 0.23 10*3/MM3 (ref 0–0.05)
IMM GRANULOCYTES NFR BLD AUTO: 1.3 % (ref 0–0.5)
LYMPHOCYTES # BLD AUTO: 2.11 10*3/MM3 (ref 0.7–3.1)
LYMPHOCYTES NFR BLD AUTO: 11.9 % (ref 19.6–45.3)
MCH RBC QN AUTO: 30.3 PG (ref 26.6–33)
MCHC RBC AUTO-ENTMCNC: 34.4 G/DL (ref 31.5–35.7)
MCV RBC AUTO: 88 FL (ref 79–97)
MONOCYTES # BLD AUTO: 0.9 10*3/MM3 (ref 0.1–0.9)
MONOCYTES NFR BLD AUTO: 5.1 % (ref 5–12)
NEUTROPHILS NFR BLD AUTO: 13.86 10*3/MM3 (ref 1.7–7)
NEUTROPHILS NFR BLD AUTO: 78.6 % (ref 42.7–76)
NRBC BLD AUTO-RTO: 0 /100 WBC (ref 0–0.2)
PLATELET # BLD AUTO: 495 10*3/MM3 (ref 140–450)
PMV BLD AUTO: 9.2 FL (ref 6–12)
POTASSIUM SERPL-SCNC: 3.2 MMOL/L (ref 3.5–5.2)
RBC # BLD AUTO: 3.5 10*6/MM3 (ref 4.14–5.8)
SODIUM SERPL-SCNC: 136 MMOL/L (ref 136–145)
WBC NRBC COR # BLD AUTO: 17.66 10*3/MM3 (ref 3.4–10.8)

## 2023-12-22 PROCEDURE — 99233 SBSQ HOSP IP/OBS HIGH 50: CPT | Performed by: INTERNAL MEDICINE

## 2023-12-22 PROCEDURE — 85025 COMPLETE CBC W/AUTO DIFF WBC: CPT | Performed by: HOSPITALIST

## 2023-12-22 PROCEDURE — 25810000003 SODIUM CHLORIDE 0.9 % SOLUTION: Performed by: NURSE PRACTITIONER

## 2023-12-22 PROCEDURE — 97530 THERAPEUTIC ACTIVITIES: CPT

## 2023-12-22 PROCEDURE — 80048 BASIC METABOLIC PNL TOTAL CA: CPT | Performed by: HOSPITALIST

## 2023-12-22 PROCEDURE — 25010000002 CEFTRIAXONE PER 250 MG: Performed by: HOSPITALIST

## 2023-12-22 PROCEDURE — 97162 PT EVAL MOD COMPLEX 30 MIN: CPT

## 2023-12-22 PROCEDURE — 25010000002 HYDROMORPHONE PER 4 MG: Performed by: NURSE PRACTITIONER

## 2023-12-22 RX ORDER — POTASSIUM CHLORIDE 750 MG/1
40 TABLET, FILM COATED, EXTENDED RELEASE ORAL
Status: COMPLETED | OUTPATIENT
Start: 2023-12-22 | End: 2023-12-23

## 2023-12-22 RX ORDER — POTASSIUM CHLORIDE 750 MG/1
40 TABLET, FILM COATED, EXTENDED RELEASE ORAL ONCE
Status: COMPLETED | OUTPATIENT
Start: 2023-12-22 | End: 2023-12-22

## 2023-12-22 RX ADMIN — HYDROMORPHONE HYDROCHLORIDE 0.5 MG: 1 INJECTION, SOLUTION INTRAMUSCULAR; INTRAVENOUS; SUBCUTANEOUS at 12:23

## 2023-12-22 RX ADMIN — OXYCODONE AND ACETAMINOPHEN 2 TABLET: 5; 325 TABLET ORAL at 09:02

## 2023-12-22 RX ADMIN — CYCLOBENZAPRINE 10 MG: 10 TABLET, FILM COATED ORAL at 09:02

## 2023-12-22 RX ADMIN — OXYCODONE AND ACETAMINOPHEN 2 TABLET: 5; 325 TABLET ORAL at 15:14

## 2023-12-22 RX ADMIN — ACETAMINOPHEN 650 MG: 325 TABLET, FILM COATED ORAL at 10:41

## 2023-12-22 RX ADMIN — POTASSIUM CHLORIDE 40 MEQ: 750 TABLET, EXTENDED RELEASE ORAL at 18:23

## 2023-12-22 RX ADMIN — CYCLOBENZAPRINE 10 MG: 10 TABLET, FILM COATED ORAL at 19:12

## 2023-12-22 RX ADMIN — HYDROMORPHONE HYDROCHLORIDE 0.5 MG: 1 INJECTION, SOLUTION INTRAMUSCULAR; INTRAVENOUS; SUBCUTANEOUS at 22:40

## 2023-12-22 RX ADMIN — SODIUM CHLORIDE 100 ML/HR: 9 INJECTION, SOLUTION INTRAVENOUS at 10:37

## 2023-12-22 RX ADMIN — POTASSIUM CHLORIDE 40 MEQ: 750 TABLET, EXTENDED RELEASE ORAL at 15:14

## 2023-12-22 RX ADMIN — CEFTRIAXONE 2000 MG: 2 INJECTION, POWDER, FOR SOLUTION INTRAMUSCULAR; INTRAVENOUS at 22:40

## 2023-12-22 RX ADMIN — OXYCODONE AND ACETAMINOPHEN 2 TABLET: 5; 325 TABLET ORAL at 19:11

## 2023-12-22 RX ADMIN — SENNOSIDES AND DOCUSATE SODIUM 2 TABLET: 50; 8.6 TABLET ORAL at 09:02

## 2023-12-22 RX ADMIN — OXYCODONE AND ACETAMINOPHEN 2 TABLET: 5; 325 TABLET ORAL at 03:54

## 2023-12-22 RX ADMIN — Medication 10 ML: at 09:02

## 2023-12-22 RX ADMIN — POTASSIUM CHLORIDE 40 MEQ: 750 TABLET, EXTENDED RELEASE ORAL at 10:41

## 2023-12-22 NOTE — PLAN OF CARE
Goal Outcome Evaluation:                      Patient alert and oriented with c/o head and neck pain- mediated per MAR. Patient up in chair several times today. Patient with IV fluids infusing. No acute distress noted, will continue to monitor.

## 2023-12-22 NOTE — PLAN OF CARE
Goal Outcome Evaluation:      Pt up to the chair. Restless at times. Medicated for head and neck pain per MAR. No other c/o voiced. VSS No s/s of distress

## 2023-12-22 NOTE — PROGRESS NOTES
"DAILY PROGRESS NOTE  [facility]    Patient Identification:  Name: Markos Virk  Age: 62 y.o.  Sex: male  :  1960  MRN: [id number]         Primary Care Physician: Provider, No Known    Subjective:  Interval History: He complains of neck pain.  He is still very weak.    Objective:    Scheduled Meds:cefTRIAXone, 2,000 mg, Intravenous, Q24H  senna-docusate sodium, 2 tablet, Oral, BID  sodium chloride, 10 mL, Intravenous, Q12H      Continuous Infusions:sodium chloride, 100 mL/hr, Last Rate: 100 mL/hr (23 1037)        Vital signs in last 24 hours:  Temp:  [98 °F (36.7 °C)-98.8 °F (37.1 °C)] 98.8 °F (37.1 °C)  Heart Rate:  [87-90] 87  Resp:  [18] 18  BP: (115-140)/(73-82) 140/82    Intake/Output:    Intake/Output Summary (Last 24 hours) at 2023 1256  Last data filed at 2023 1224  Gross per 24 hour   Intake 1550 ml   Output 1775 ml   Net -225 ml       Exam:  /82 (BP Location: Right arm, Patient Position: Lying)   Pulse 87   Temp 98.8 °F (37.1 °C) (Oral)   Resp 18   Ht 172.7 cm (68\")   Wt 69.4 kg (153 lb)   SpO2 92%   BMI 23.26 kg/m²     General Appearance:    Alert, cooperative, no distress   Head:    Normocephalic, without obvious abnormality, atraumatic   Eyes:       Throat:   Lips, tongue, gums normal   Neck:   Supple, symmetrical, trachea midline, no JVD   Lungs:     Clear to auscultation bilaterally, respirations unlabored   Chest Wall:    No tenderness or deformity    Heart:    Regular rate and rhythm, S1 and S2 normal, no murmur,no  Rub or gallop   Abdomen:     Soft, nontender, bowel sounds active, no masses, no organomegaly    Extremities:   Extremities normal, atraumatic, no cyanosis or edema   Pulses:      Skin:   Skin is warm and dry,  no rashes or palpable lesions   Neurologic:   no focal deficits noted      Lab Results (last 72 hours)       Procedure Component Value Units Date/Time    Respiratory Culture - Sputum, Cough [[id number]] Collected: 23 " 0945    Specimen: Sputum from Cough Updated: 12/20/23 0954     Respiratory Culture Scant growth (1+) The culture consists of normal respiratory kyle. This is a preliminary report; final report to follow.     Gram Stain Moderate (3+) WBCs seen      Rare (1+) Epithelial cells per low power field      Rare (1+) Mixed bacterial kyle    Basic Metabolic Panel [[id number]]  (Abnormal) Collected: 12/20/23 0558    Specimen: Blood Updated: 12/20/23 0716     Glucose 268 mg/dL      BUN 17 mg/dL      Creatinine 0.83 mg/dL      Sodium 133 mmol/L      Potassium 3.4 mmol/L      Chloride 98 mmol/L      CO2 22.6 mmol/L      Calcium 8.7 mg/dL      BUN/Creatinine Ratio 20.5     Anion Gap 12.4 mmol/L      eGFR 100.2 mL/min/1.73     Narrative:      GFR Normal >60  Chronic Kidney Disease <60  Kidney Failure <15      C-reactive Protein [[id number]]  (Abnormal) Collected: 12/20/23 0558    Specimen: Blood Updated: 12/20/23 0716     C-Reactive Protein 25.86 mg/dL     CBC & Differential [[id number]]  (Abnormal) Collected: 12/20/23 0558    Specimen: Blood Updated: 12/20/23 0707    Narrative:      The following orders were created for panel order CBC & Differential.  Procedure                               Abnormality         Status                     ---------                               -----------         ------                     CBC Auto Differential[[id number]]        Abnormal            Final result                 Please view results for these tests on the individual orders.    CBC Auto Differential [[id number]]  (Abnormal) Collected: 12/20/23 0558    Specimen: Blood Updated: 12/20/23 0707     WBC 25.39 10*3/mm3      RBC 3.98 10*6/mm3      Hemoglobin 12.1 g/dL      Hematocrit 35.4 %      MCV 88.9 fL      MCH 30.4 pg      MCHC 34.2 g/dL      RDW 11.4 %      RDW-SD 36.3 fl      MPV 10.3 fL      Platelets 511 10*3/mm3      Neutrophil % 87.2 %      Lymphocyte % 5.4 %      Monocyte % 5.5 %      Eosinophil % 0.0 %      Basophil % 0.2 %       Immature Grans % 1.7 %      Neutrophils, Absolute 22.13 10*3/mm3      Lymphocytes, Absolute 1.36 10*3/mm3      Monocytes, Absolute 1.39 10*3/mm3      Eosinophils, Absolute 0.01 10*3/mm3      Basophils, Absolute 0.06 10*3/mm3      Immature Grans, Absolute 0.44 10*3/mm3      nRBC 0.0 /100 WBC     Blood Culture - Blood, Arm, Left [[id number]] Collected: 12/20/23 0558    Specimen: Blood from Arm, Left Updated: 12/20/23 0633    Blood Culture - Blood, Arm, Right [[id number]] Collected: 12/20/23 0601    Specimen: Blood from Arm, Right Updated: 12/20/23 0633    Legionella Antigen, Urine - Urine, Urine, Clean Catch [[id number]]  (Normal) Collected: 12/19/23 0409    Specimen: Urine, Clean Catch Updated: 12/19/23 1633     LEGIONELLA ANTIGEN, URINE Negative    S. Pneumo Ag Urine or CSF - Urine, Urine, Clean Catch [[id number]]  (Abnormal) Collected: 12/19/23 0409    Specimen: Urine, Clean Catch Updated: 12/19/23 1632     Strep Pneumo Ag Positive    Blood Culture ID, PCR - Blood, Arm, Right [[id number]]  (Abnormal) Collected: 12/18/23 2359    Specimen: Blood from Arm, Right Updated: 12/19/23 1434     BCID, PCR Streptococcus pneumoniae. Identification by BCID2 PCR.     BOTTLE TYPE Aerobic Bottle    Blood Culture - Blood, Arm, Left [[id number]]  (Abnormal) Collected: 12/19/23 0011    Specimen: Blood from Arm, Left Updated: 12/19/23 1322     Blood Culture Abnormal Stain     Gram Stain Aerobic Bottle Gram positive cocci in pairs    Blood Culture - Blood, Arm, Right [[id number]]  (Abnormal) Collected: 12/18/23 2359    Specimen: Blood from Arm, Right Updated: 12/19/23 1321     Blood Culture Abnormal Stain     Gram Stain Aerobic Bottle Gram positive cocci in pairs      Anaerobic Bottle Gram positive cocci in pairs    Lactic Acid, Plasma [[id number]]  (Normal) Collected: 12/19/23 0457    Specimen: Blood Updated: 12/19/23 0624     Lactate 1.2 mmol/L     Basic Metabolic Panel [[id number]]  (Abnormal) Collected: 12/19/23 0457    Specimen: Blood  Updated: 12/19/23 0624     Glucose 204 mg/dL      BUN 14 mg/dL      Creatinine 0.73 mg/dL      Sodium 136 mmol/L      Potassium 3.6 mmol/L      Chloride 100 mmol/L      CO2 22.2 mmol/L      Calcium 7.9 mg/dL      BUN/Creatinine Ratio 19.2     Anion Gap 13.8 mmol/L      eGFR 104.2 mL/min/1.73     Narrative:      GFR Normal >60  Chronic Kidney Disease <60  Kidney Failure <15      Protime-INR [[id number]]  (Abnormal) Collected: 12/19/23 0456    Specimen: Blood Updated: 12/19/23 0618     Protime 14.9 Seconds      INR 1.16    CBC Auto Differential [[id number]]  (Abnormal) Collected: 12/19/23 0457    Specimen: Blood Updated: 12/19/23 0608     WBC 19.98 10*3/mm3      RBC 4.16 10*6/mm3      Hemoglobin 12.0 g/dL      Hematocrit 36.2 %      MCV 87.0 fL      MCH 28.8 pg      MCHC 33.1 g/dL      RDW 11.3 %      RDW-SD 35.4 fl      MPV 10.3 fL      Platelets 398 10*3/mm3      Neutrophil % 93.5 %      Lymphocyte % 2.0 %      Monocyte % 3.0 %      Eosinophil % 0.1 %      Basophil % 0.3 %      Immature Grans % 1.1 %      Neutrophils, Absolute 18.73 10*3/mm3      Lymphocytes, Absolute 0.39 10*3/mm3      Monocytes, Absolute 0.59 10*3/mm3      Eosinophils, Absolute 0.01 10*3/mm3      Basophils, Absolute 0.05 10*3/mm3      Immature Grans, Absolute 0.21 10*3/mm3      nRBC 0.0 /100 WBC     Urine Drug Screen - Urine, Clean Catch [[id number]]  (Abnormal) Collected: 12/19/23 0409    Specimen: Urine, Clean Catch Updated: 12/19/23 0502     Amphet/Methamphet, Screen Positive     Barbiturates Screen, Urine Negative     Benzodiazepine Screen, Urine Negative     Cocaine Screen, Urine Negative     Opiate Screen Positive     THC, Screen, Urine Negative     Methadone Screen, Urine Negative     Oxycodone Screen, Urine Negative     Fentanyl, Urine Negative    Narrative:      Negative Thresholds Per Drugs Screened:    Amphetamines                 500 ng/ml  Barbiturates                 200 ng/ml  Benzodiazepines              100 ng/ml  Cocaine                       300 ng/ml  Methadone                    300 ng/ml  Opiates                      300 ng/ml  Oxycodone                    100 ng/ml  THC                           50 ng/ml  Fentanyl                       5 ng/ml      The Normal Value for all drugs tested is negative. This report includes final unconfirmed screening results to be used for medical treatment purposes only. Unconfirmed results must not be used for non-medical purposes such as employment or legal testing. Clinical consideration should be applied to any drug of abuse test, particularly when unconfirmed results are used.            Respiratory Panel PCR w/COVID-19(SARS-CoV-2) GINNY/ROLY/JOSE ANTONIO/PAD/COR/JEFFREY In-House, NP Swab in UTM/VTM, 2 HR TAT - Swab, Nasopharynx [[id number]]  (Normal) Collected: 12/19/23 0001    Specimen: Swab from Nasopharynx Updated: 12/19/23 0117     ADENOVIRUS, PCR Not Detected     Coronavirus 229E Not Detected     Coronavirus HKU1 Not Detected     Coronavirus NL63 Not Detected     Coronavirus OC43 Not Detected     COVID19 Not Detected     Human Metapneumovirus Not Detected     Human Rhinovirus/Enterovirus Not Detected     Influenza A PCR Not Detected     Influenza B PCR Not Detected     Parainfluenza Virus 1 Not Detected     Parainfluenza Virus 2 Not Detected     Parainfluenza Virus 3 Not Detected     Parainfluenza Virus 4 Not Detected     RSV, PCR Not Detected     Bordetella pertussis pcr Not Detected     Bordetella parapertussis PCR Not Detected     Chlamydophila pneumoniae PCR Not Detected     Mycoplasma pneumo by PCR Not Detected    Narrative:      In the setting of a positive respiratory panel with a viral infection PLUS a negative procalcitonin without other underlying concern for bacterial infection, consider observing off antibiotics or discontinuation of antibiotics and continue supportive care. If the respiratory panel is positive for atypical bacterial infection (Bordetella pertussis, Chlamydophila pneumoniae, or  "Mycoplasma pneumoniae), consider antibiotic de-escalation to target atypical bacterial infection.    Comprehensive Metabolic Panel [[id number]]  (Abnormal) Collected: 12/18/23 2356    Specimen: Blood Updated: 12/19/23 0100     Glucose 162 mg/dL      BUN 16 mg/dL      Creatinine 0.92 mg/dL      Sodium 135 mmol/L      Potassium 3.5 mmol/L      Chloride 99 mmol/L      CO2 22.0 mmol/L      Calcium 8.9 mg/dL      Total Protein 7.0 g/dL      Albumin 3.7 g/dL      ALT (SGPT) 31 U/L      AST (SGOT) 31 U/L      Alkaline Phosphatase 131 U/L      Total Bilirubin 0.3 mg/dL      Globulin 3.3 gm/dL      A/G Ratio 1.1 g/dL      BUN/Creatinine Ratio 17.4     Anion Gap 14.0 mmol/L      eGFR 95.2 mL/min/1.73     Narrative:      GFR Normal >60  Chronic Kidney Disease <60  Kidney Failure <15      Lactic Acid, Plasma [[id number]]  (Normal) Collected: 12/18/23 2356    Specimen: Blood Updated: 12/19/23 0057     Lactate 1.1 mmol/L     Procalcitonin [[id number]]  (Abnormal) Collected: 12/18/23 2356    Specimen: Blood Updated: 12/19/23 0039     Procalcitonin 5.45 ng/mL     Narrative:      As a Marker for Sepsis (Non-Neonates):    1. <0.5 ng/mL represents a low risk of severe sepsis and/or septic shock.  2. >2 ng/mL represents a high risk of severe sepsis and/or septic shock.    As a Marker for Lower Respiratory Tract Infections that require antibiotic therapy:    PCT on Admission    Antibiotic Therapy       6-12 Hrs later    >0.5                Strongly Recommended  >0.25 - <0.5        Recommended   0.1 - 0.25          Discouraged              Remeasure/reassess PCT  <0.1                Strongly Discouraged     Remeasure/reassess PCT    As 28 day mortality risk marker: \"Change in Procalcitonin Result\" (>80% or <=80%) if Day 0 (or Day 1) and Day 4 values are available. Refer to http://www.[employer]-pct-calculator.com    Change in PCT <=80%  A decrease of PCT levels below or equal to 80% defines a positive change in PCT test result representing a " higher risk for 28-day all-cause mortality of patients diagnosed with severe sepsis for septic shock.    Change in PCT >80%  A decrease of PCT levels of more than 80% defines a negative change in PCT result representing a lower risk for 28-day all-cause mortality of patients diagnosed with severe sepsis or septic shock.       Protime-INR [[id number]]  (Abnormal) Collected: 12/18/23 2356    Specimen: Blood Updated: 12/19/23 0022     Protime 15.0 Seconds      INR 1.16    aPTT [[id number]]  (Abnormal) Collected: 12/18/23 2356    Specimen: Blood Updated: 12/19/23 0022     PTT 36.7 seconds     CBC & Differential [[id number]]  (Abnormal) Collected: 12/18/23 2356    Specimen: Blood Updated: 12/19/23 0013    Narrative:      The following orders were created for panel order CBC & Differential.  Procedure                               Abnormality         Status                     ---------                               -----------         ------                     CBC Auto Differential[[id number]]        Abnormal            Final result                 Please view results for these tests on the individual orders.    CBC Auto Differential [[id number]]  (Abnormal) Collected: 12/18/23 2356    Specimen: Blood Updated: 12/19/23 0013     WBC 20.96 10*3/mm3      RBC 3.90 10*6/mm3      Hemoglobin 12.1 g/dL      Hematocrit 34.1 %      MCV 87.4 fL      MCH 31.0 pg      MCHC 35.5 g/dL      RDW 11.4 %      RDW-SD 35.7 fl      MPV 10.0 fL      Platelets 375 10*3/mm3      Neutrophil % 83.5 %      Lymphocyte % 7.5 %      Monocyte % 7.3 %      Eosinophil % 0.6 %      Basophil % 0.4 %      Immature Grans % 0.7 %      Neutrophils, Absolute 17.51 10*3/mm3      Lymphocytes, Absolute 1.57 10*3/mm3      Monocytes, Absolute 1.52 10*3/mm3      Eosinophils, Absolute 0.13 10*3/mm3      Basophils, Absolute 0.08 10*3/mm3      Immature Grans, Absolute 0.15 10*3/mm3      nRBC 0.0 /100 WBC           Data Review:  Results from last 7 days   Lab Units  "12/22/23  0455 12/21/23  0657 12/20/23  0558   SODIUM mmol/L 136 132* 133*   POTASSIUM mmol/L 3.2* 3.1* 3.4*   CHLORIDE mmol/L 99 97* 98   CO2 mmol/L 25.8 23.8 22.6   BUN mg/dL 10 11 17   CREATININE mg/dL 0.63* 0.61* 0.83   GLUCOSE mg/dL 124* 137* 268*   CALCIUM mg/dL 8.1* 8.5* 8.7     Results from last 7 days   Lab Units 12/22/23  0455 12/21/23  0657 12/20/23  0558   WBC 10*3/mm3 17.66* 19.68* 25.39*   HEMOGLOBIN g/dL 10.6* 11.5* 12.1*   HEMATOCRIT % 30.8* 34.8* 35.4*   PLATELETS 10*3/mm3 495* 483* 511*             Lab Results   Lab Value Date/Time    TROPONINT <0.010 07/29/2022 1714         Results from last 7 days   Lab Units 12/18/23  2356   ALK PHOS U/L 131*   BILIRUBIN mg/dL 0.3   ALT (SGPT) U/L 31   AST (SGOT) U/L 31             No results found for: \"POCGLU\"  Results from last 7 days   Lab Units 12/19/23  0456 12/18/23  2356   INR  1.16* 1.16*       History reviewed. No pertinent past medical history.    Assessment:  Active Hospital Problems    Diagnosis  POA    **Pneumonia [J18.9]  Yes    Neck pain [M54.2]  Unknown    Leukocytosis [D72.829]  Unknown    Pneumonia due to infectious organism [J18.9]  Unknown      Resolved Hospital Problems   No resolved problems to display.       Plan:  Continue with antibiotics as recommended per infectious disease.  Neurosurgery consult noted.  C-spine MRI showing some significant degenerative disc disease.  Pain control and follow-up on lab.  DC planning.  Possibly home in another day or 2 if he is feeling a little bit better and finish course of oral antibiotics as recommended by infectious disease.    Edgardo Núñez MD  12/22/2023  12:56 EST    "

## 2023-12-22 NOTE — PROGRESS NOTES
ID note for sepsis  S: Mental status improved.  He desats overnight requires oxygen but is okay and does not need any oxygen during the day.  Potassium slightly low at 3.2.    Physical Exam:   Vital Signs   Temp:  [98 °F (36.7 °C)-98.8 °F (37.1 °C)] 98.8 °F (37.1 °C)  Heart Rate:  [87-92] 87  Resp:  [18] 18  BP: (106-140)/(73-82) 140/82    GENERAL: Awake and alert, ill-appearing  HEENT: Oropharynx is clear. Hearing is grossly normal.   EYES: . No conjunctival injection. No lid lag.   LUNGS:normal respiratory effort.   SKIN: no cutaneous eruptions in exposed areas  PSYCHIATRIC: .  Cooperative today.    Results Review:  White count 17.66  Creatinine 0.63  CT chest independently interpreted: Dense left upper lobe pneumonia  Microbiology:  12/18 Bcx 2/2 pneumococcus  12/19 RPP neg  12/20 blood cultures no growth to date       A/p  1.  Streptococcal pneumoniae septicemia secondary #2  2.  Left lower lobe pneumococcal pneumonia  3.  Encephalopathy, question methamphetamine withdrawal    He seems to be doing better today.  CT with dense consolidation in keeping with pneumococcal disease.  Continue Rocephin 2 g IV every 24 hours.  When ready for discharge I think he could be transitioned over to amoxicillin 1 g p.o. every 8 hours, stop date 12/30/2023.  With antibiotic plan in place, not plan to see daily, please call with questions or concerns

## 2023-12-22 NOTE — PLAN OF CARE
Goal Outcome Evaluation:  Plan of Care Reviewed With: patient           Outcome Evaluation: Pt is 63 yo male admitted to [facility] for pna, neck pain. Pt lives with friends, independent prior to admission. Patient agreeable to therapy, c/o neck pain at 6/10 level. Patient performed bed mobility independently, sit to stand with supervision, ambulated 2 laps around nurses station with SBA, no SOA or LOB noted. Patient encouraged to continue to mobilize with nsg staff, safe to d/c home with friends, no further indication for skilled PT.      Anticipated Discharge Disposition (PT): home with assist

## 2023-12-23 PROBLEM — E87.6 HYPOKALEMIA: Status: RESOLVED | Noted: 2023-12-23 | Resolved: 2023-12-23

## 2023-12-23 PROBLEM — R78.81 BACTEREMIA: Status: ACTIVE | Noted: 2023-12-23

## 2023-12-23 PROBLEM — E87.6 HYPOKALEMIA: Status: ACTIVE | Noted: 2023-12-23

## 2023-12-23 PROBLEM — D64.9 ANEMIA: Status: ACTIVE | Noted: 2023-12-23

## 2023-12-23 PROBLEM — D75.839 THROMBOCYTOSIS: Status: ACTIVE | Noted: 2023-12-23

## 2023-12-23 PROBLEM — A41.9 SEPSIS: Status: ACTIVE | Noted: 2023-12-23

## 2023-12-23 LAB
ANION GAP SERPL CALCULATED.3IONS-SCNC: 13 MMOL/L (ref 5–15)
BASOPHILS # BLD AUTO: 0.05 10*3/MM3 (ref 0–0.2)
BASOPHILS NFR BLD AUTO: 0.3 % (ref 0–1.5)
BUN SERPL-MCNC: 10 MG/DL (ref 8–23)
BUN/CREAT SERPL: 13 (ref 7–25)
CALCIUM SPEC-SCNC: 8.4 MG/DL (ref 8.6–10.5)
CHLORIDE SERPL-SCNC: 100 MMOL/L (ref 98–107)
CO2 SERPL-SCNC: 24 MMOL/L (ref 22–29)
CREAT SERPL-MCNC: 0.77 MG/DL (ref 0.76–1.27)
DEPRECATED RDW RBC AUTO: 38.1 FL (ref 37–54)
EGFRCR SERPLBLD CKD-EPI 2021: 102.5 ML/MIN/1.73
EOSINOPHIL # BLD AUTO: 0.68 10*3/MM3 (ref 0–0.4)
EOSINOPHIL NFR BLD AUTO: 4.1 % (ref 0.3–6.2)
ERYTHROCYTE [DISTWIDTH] IN BLOOD BY AUTOMATED COUNT: 11.5 % (ref 12.3–15.4)
GLUCOSE SERPL-MCNC: 133 MG/DL (ref 65–99)
HCT VFR BLD AUTO: 32.7 % (ref 37.5–51)
HGB BLD-MCNC: 10.8 G/DL (ref 13–17.7)
IMM GRANULOCYTES # BLD AUTO: 0.21 10*3/MM3 (ref 0–0.05)
IMM GRANULOCYTES NFR BLD AUTO: 1.3 % (ref 0–0.5)
LYMPHOCYTES # BLD AUTO: 2.08 10*3/MM3 (ref 0.7–3.1)
LYMPHOCYTES NFR BLD AUTO: 12.6 % (ref 19.6–45.3)
MCH RBC QN AUTO: 29.9 PG (ref 26.6–33)
MCHC RBC AUTO-ENTMCNC: 33 G/DL (ref 31.5–35.7)
MCV RBC AUTO: 90.6 FL (ref 79–97)
MONOCYTES # BLD AUTO: 0.76 10*3/MM3 (ref 0.1–0.9)
MONOCYTES NFR BLD AUTO: 4.6 % (ref 5–12)
NEUTROPHILS NFR BLD AUTO: 12.7 10*3/MM3 (ref 1.7–7)
NEUTROPHILS NFR BLD AUTO: 77.1 % (ref 42.7–76)
NRBC BLD AUTO-RTO: 0 /100 WBC (ref 0–0.2)
PLATELET # BLD AUTO: 631 10*3/MM3 (ref 140–450)
PMV BLD AUTO: 9.4 FL (ref 6–12)
POTASSIUM SERPL-SCNC: 4.1 MMOL/L (ref 3.5–5.2)
RBC # BLD AUTO: 3.61 10*6/MM3 (ref 4.14–5.8)
SODIUM SERPL-SCNC: 137 MMOL/L (ref 136–145)
WBC NRBC COR # BLD AUTO: 16.48 10*3/MM3 (ref 3.4–10.8)

## 2023-12-23 PROCEDURE — 80048 BASIC METABOLIC PNL TOTAL CA: CPT | Performed by: HOSPITALIST

## 2023-12-23 PROCEDURE — 25010000002 HYDROMORPHONE PER 4 MG: Performed by: NURSE PRACTITIONER

## 2023-12-23 PROCEDURE — 85025 COMPLETE CBC W/AUTO DIFF WBC: CPT | Performed by: HOSPITALIST

## 2023-12-23 RX ORDER — GUAIFENESIN 600 MG/1
600 TABLET, EXTENDED RELEASE ORAL EVERY 12 HOURS SCHEDULED
Status: DISCONTINUED | OUTPATIENT
Start: 2023-12-23 | End: 2023-12-26 | Stop reason: HOSPADM

## 2023-12-23 RX ORDER — FLUTICASONE PROPIONATE 50 MCG
2 SPRAY, SUSPENSION (ML) NASAL DAILY
Status: DISCONTINUED | OUTPATIENT
Start: 2023-12-23 | End: 2023-12-26 | Stop reason: HOSPADM

## 2023-12-23 RX ADMIN — Medication 10 ML: at 20:11

## 2023-12-23 RX ADMIN — OXYCODONE AND ACETAMINOPHEN 2 TABLET: 5; 325 TABLET ORAL at 17:07

## 2023-12-23 RX ADMIN — HYDROMORPHONE HYDROCHLORIDE 0.5 MG: 1 INJECTION, SOLUTION INTRAMUSCULAR; INTRAVENOUS; SUBCUTANEOUS at 23:37

## 2023-12-23 RX ADMIN — CYCLOBENZAPRINE 10 MG: 10 TABLET, FILM COATED ORAL at 18:48

## 2023-12-23 RX ADMIN — GUAIFENESIN 600 MG: 600 TABLET, EXTENDED RELEASE ORAL at 14:22

## 2023-12-23 RX ADMIN — OXYCODONE AND ACETAMINOPHEN 2 TABLET: 5; 325 TABLET ORAL at 05:23

## 2023-12-23 RX ADMIN — SENNOSIDES AND DOCUSATE SODIUM 2 TABLET: 50; 8.6 TABLET ORAL at 08:32

## 2023-12-23 RX ADMIN — OXYCODONE AND ACETAMINOPHEN 2 TABLET: 5; 325 TABLET ORAL at 11:19

## 2023-12-23 RX ADMIN — SENNOSIDES AND DOCUSATE SODIUM 2 TABLET: 50; 8.6 TABLET ORAL at 20:11

## 2023-12-23 RX ADMIN — CYCLOBENZAPRINE 10 MG: 10 TABLET, FILM COATED ORAL at 03:00

## 2023-12-23 RX ADMIN — OXYCODONE AND ACETAMINOPHEN 2 TABLET: 5; 325 TABLET ORAL at 00:41

## 2023-12-23 RX ADMIN — FLUTICASONE PROPIONATE 2 SPRAY: 50 SPRAY, METERED NASAL at 14:22

## 2023-12-23 RX ADMIN — GUAIFENESIN 600 MG: 600 TABLET, EXTENDED RELEASE ORAL at 20:11

## 2023-12-23 RX ADMIN — Medication 10 ML: at 08:32

## 2023-12-23 RX ADMIN — CYCLOBENZAPRINE 10 MG: 10 TABLET, FILM COATED ORAL at 11:19

## 2023-12-23 RX ADMIN — OXYCODONE AND ACETAMINOPHEN 2 TABLET: 5; 325 TABLET ORAL at 21:10

## 2023-12-23 RX ADMIN — POTASSIUM CHLORIDE 40 MEQ: 750 TABLET, EXTENDED RELEASE ORAL at 08:31

## 2023-12-23 NOTE — PROGRESS NOTES
[facility]    [phone]   LOS: 4 days     Name: Markos Virk  Age/Sex: 62 y.o. male  :  1960        PCP: Provider, No Known  Chief Complaint   Patient presents with    Neck Pain     Pt went to [facility], has pnemonia and severe pain in his neck. Pt states that his pain level is 10/10.      Subjective   He is standing at the bedside denies new issues or complaints overall feeling better.  Is complaining a little bit of some nasal congestion.  General: No Fever or Chills, Cardiac: No Chest Pain or Palpitations, Resp: No Cough or SOA, GI: No Nausea, Vomiting, or Diarrhea, and Other: No bleeding    cefTRIAXone, 2,000 mg, Intravenous, Q24H  fluticasone, 2 spray, Each Nare, Daily  guaiFENesin, 600 mg, Oral, Q12H  senna-docusate sodium, 2 tablet, Oral, BID  sodium chloride, 10 mL, Intravenous, Q12H      sodium chloride, 100 mL/hr, Last Rate: 100 mL/hr (23 1037)        Objective   Vital Signs  Temp:  [97.9 °F (36.6 °C)-98.2 °F (36.8 °C)] 98.1 °F (36.7 °C)  Heart Rate:  [91-99] 99  Resp:  [18] 18  BP: (121-143)/(76-92) 142/92  Body mass index is 23.26 kg/m².    Intake/Output Summary (Last 24 hours) at 2023 1206  Last data filed at 2023 1118  Gross per 24 hour   Intake 660 ml   Output 3775 ml   Net -3115 ml       Physical Exam  Vitals and nursing note reviewed.   Constitutional:       General: He is not in acute distress.     Appearance: He is ill-appearing.   Cardiovascular:      Rate and Rhythm: Normal rate and regular rhythm.   Pulmonary:      Effort: Pulmonary effort is normal. No respiratory distress.   Neurological:      General: No focal deficit present.      Mental Status: He is alert. Mental status is at baseline.           Results Review:       I reviewed the patient's new clinical results.  Results from last 7 days   Lab Units 23  0424 23  0455 23  0657 23  0558 23  0457 23  2356   WBC 10*3/mm3 16.48* 17.66* 19.68* 25.39* 19.98* 20.96*    HEMOGLOBIN g/dL 10.8* 10.6* 11.5* 12.1* 12.0* 12.1*   PLATELETS 10*3/mm3 631* 495* 483* 511* 398 375     Results from last 7 days   Lab Units 12/23/23  0424 12/22/23  0455 12/21/23  0657 12/20/23  0558 12/19/23  0457 12/18/23  2356   SODIUM mmol/L 137 136 132* 133* 136 135*   POTASSIUM mmol/L 4.1 3.2* 3.1* 3.4* 3.6 3.5   CHLORIDE mmol/L 100 99 97* 98 100 99   CO2 mmol/L 24.0 25.8 23.8 22.6 22.2 22.0   BUN mg/dL 10 10 11 17 14 16   CREATININE mg/dL 0.77 0.63* 0.61* 0.83 0.73* 0.92   CALCIUM mg/dL 8.4* 8.1* 8.5* 8.7 7.9* 8.9   Estimated Creatinine Clearance: 100.1 mL/min (by C-G formula based on SCr of 0.77 mg/dL).      Assessment & Plan   Active Hospital Problems    Diagnosis  POA    **Pneumonia [J18.9]  Yes    Anemia [D64.9]  Unknown    Thrombocytosis [D75.839]  Unknown    Bacteremia [R78.81]  Unknown    Sepsis [A41.9]  Unknown    Neck pain [M54.2]  Unknown    Leukocytosis [D72.829]  Unknown    Pneumonia due to infectious organism [J18.9]  Unknown      Resolved Hospital Problems    Diagnosis Date Resolved POA    Hypokalemia [E87.6] 12/23/2023 Unknown       PLAN  This is a 62-year-old gentleman with a history of prior tobacco use, an otherwise healthy who presents to the hospital with cough congestion and neck pain and is found to have pneumonia with sepsis  -Pneumococcal pneumonia with bacteremia present on admission.  Responding well to antibiotics but white count has been a little sluggish to normalize.  Continue IV antibiotics for now.  Appreciate ID input.  -Continue supportive care Flonase and guaifenesin ordered today  -Electrolytes are stable today  -Neck pain seems to be improving likely secondary to musculoskeletal strain from coughing and infection  -His anemia and thrombocytosis are likely reactive.  -Repeat blood cultures were negative  -Pharmacologic DVT prophylaxis with subcu heparin  -Full code      Disposition  Expected Discharge Date: 12/26/2023; Expected Discharge Time:        Tutu Taylor  MD [facility]  12/23/23  12:06 EST

## 2023-12-23 NOTE — PLAN OF CARE
Goal Outcome Evaluation:      Pt up to chair. Restless at times. Medicated for head and neck pain per MAR. NO other c/o voiced. VSS No s/s of distress.

## 2023-12-23 NOTE — PLAN OF CARE
Goal Outcome Evaluation:                      Patient alert and oriented with constant c/o neck and head pain- medicated per MAR. Patient up to bedside and bathroom today. No acute distress noted, will continue to monitor.

## 2023-12-24 LAB
ALBUMIN SERPL-MCNC: 3 G/DL (ref 3.5–5.2)
ANION GAP SERPL CALCULATED.3IONS-SCNC: 9.4 MMOL/L (ref 5–15)
BASOPHILS # BLD AUTO: 0.05 10*3/MM3 (ref 0–0.2)
BASOPHILS NFR BLD AUTO: 0.3 % (ref 0–1.5)
BUN SERPL-MCNC: 9 MG/DL (ref 8–23)
BUN/CREAT SERPL: 13.6 (ref 7–25)
CALCIUM SPEC-SCNC: 8.9 MG/DL (ref 8.6–10.5)
CHLORIDE SERPL-SCNC: 92 MMOL/L (ref 98–107)
CO2 SERPL-SCNC: 27.6 MMOL/L (ref 22–29)
CREAT SERPL-MCNC: 0.66 MG/DL (ref 0.76–1.27)
DEPRECATED RDW RBC AUTO: 36.2 FL (ref 37–54)
EGFRCR SERPLBLD CKD-EPI 2021: 107.4 ML/MIN/1.73
EOSINOPHIL # BLD AUTO: 0.5 10*3/MM3 (ref 0–0.4)
EOSINOPHIL NFR BLD AUTO: 3.2 % (ref 0.3–6.2)
ERYTHROCYTE [DISTWIDTH] IN BLOOD BY AUTOMATED COUNT: 11.3 % (ref 12.3–15.4)
GLUCOSE SERPL-MCNC: 153 MG/DL (ref 65–99)
HBA1C MFR BLD: 6.1 % (ref 4.8–5.6)
HCT VFR BLD AUTO: 34.5 % (ref 37.5–51)
HGB BLD-MCNC: 11.6 G/DL (ref 13–17.7)
IMM GRANULOCYTES # BLD AUTO: 0.24 10*3/MM3 (ref 0–0.05)
IMM GRANULOCYTES NFR BLD AUTO: 1.6 % (ref 0–0.5)
LYMPHOCYTES # BLD AUTO: 2.1 10*3/MM3 (ref 0.7–3.1)
LYMPHOCYTES NFR BLD AUTO: 13.6 % (ref 19.6–45.3)
MAGNESIUM SERPL-MCNC: 1.7 MG/DL (ref 1.6–2.4)
MCH RBC QN AUTO: 30 PG (ref 26.6–33)
MCHC RBC AUTO-ENTMCNC: 33.6 G/DL (ref 31.5–35.7)
MCV RBC AUTO: 89.1 FL (ref 79–97)
MONOCYTES # BLD AUTO: 0.98 10*3/MM3 (ref 0.1–0.9)
MONOCYTES NFR BLD AUTO: 6.3 % (ref 5–12)
NEUTROPHILS NFR BLD AUTO: 11.61 10*3/MM3 (ref 1.7–7)
NEUTROPHILS NFR BLD AUTO: 75 % (ref 42.7–76)
NRBC BLD AUTO-RTO: 0 /100 WBC (ref 0–0.2)
OSMOLALITY UR: 397 MOSM/KG
PHOSPHATE SERPL-MCNC: 3.7 MG/DL (ref 2.5–4.5)
PLATELET # BLD AUTO: 638 10*3/MM3 (ref 140–450)
PMV BLD AUTO: 9 FL (ref 6–12)
POTASSIUM SERPL-SCNC: 3.9 MMOL/L (ref 3.5–5.2)
RBC # BLD AUTO: 3.87 10*6/MM3 (ref 4.14–5.8)
SODIUM SERPL-SCNC: 129 MMOL/L (ref 136–145)
SODIUM UR-SCNC: 133 MMOL/L
WBC NRBC COR # BLD AUTO: 15.48 10*3/MM3 (ref 3.4–10.8)

## 2023-12-24 PROCEDURE — 25010000002 HEPARIN (PORCINE) PER 1000 UNITS: Performed by: HOSPITALIST

## 2023-12-24 PROCEDURE — 84300 ASSAY OF URINE SODIUM: CPT | Performed by: HOSPITALIST

## 2023-12-24 PROCEDURE — 83735 ASSAY OF MAGNESIUM: CPT | Performed by: HOSPITALIST

## 2023-12-24 PROCEDURE — 25010000002 CEFTRIAXONE PER 250 MG: Performed by: HOSPITALIST

## 2023-12-24 PROCEDURE — 83935 ASSAY OF URINE OSMOLALITY: CPT | Performed by: HOSPITALIST

## 2023-12-24 PROCEDURE — 25010000002 HYDROMORPHONE PER 4 MG: Performed by: NURSE PRACTITIONER

## 2023-12-24 PROCEDURE — 80069 RENAL FUNCTION PANEL: CPT | Performed by: HOSPITALIST

## 2023-12-24 PROCEDURE — 85025 COMPLETE CBC W/AUTO DIFF WBC: CPT | Performed by: HOSPITALIST

## 2023-12-24 PROCEDURE — 83036 HEMOGLOBIN GLYCOSYLATED A1C: CPT | Performed by: HOSPITALIST

## 2023-12-24 RX ORDER — HEPARIN SODIUM 5000 [USP'U]/ML
5000 INJECTION, SOLUTION INTRAVENOUS; SUBCUTANEOUS EVERY 8 HOURS SCHEDULED
Status: DISCONTINUED | OUTPATIENT
Start: 2023-12-24 | End: 2023-12-26 | Stop reason: HOSPADM

## 2023-12-24 RX ORDER — HYDROCODONE BITARTRATE AND ACETAMINOPHEN 5; 325 MG/1; MG/1
1 TABLET ORAL EVERY 4 HOURS PRN
Status: DISCONTINUED | OUTPATIENT
Start: 2023-12-24 | End: 2023-12-25

## 2023-12-24 RX ADMIN — CYCLOBENZAPRINE 10 MG: 10 TABLET, FILM COATED ORAL at 11:52

## 2023-12-24 RX ADMIN — FLUTICASONE PROPIONATE 2 SPRAY: 50 SPRAY, METERED NASAL at 08:26

## 2023-12-24 RX ADMIN — SENNOSIDES AND DOCUSATE SODIUM 2 TABLET: 50; 8.6 TABLET ORAL at 08:26

## 2023-12-24 RX ADMIN — OXYCODONE AND ACETAMINOPHEN 2 TABLET: 5; 325 TABLET ORAL at 02:26

## 2023-12-24 RX ADMIN — CYCLOBENZAPRINE 10 MG: 10 TABLET, FILM COATED ORAL at 02:26

## 2023-12-24 RX ADMIN — CEFTRIAXONE 2000 MG: 2 INJECTION, POWDER, FOR SOLUTION INTRAMUSCULAR; INTRAVENOUS at 00:27

## 2023-12-24 RX ADMIN — CYCLOBENZAPRINE 10 MG: 10 TABLET, FILM COATED ORAL at 23:49

## 2023-12-24 RX ADMIN — HYDROMORPHONE HYDROCHLORIDE 0.5 MG: 1 INJECTION, SOLUTION INTRAMUSCULAR; INTRAVENOUS; SUBCUTANEOUS at 04:53

## 2023-12-24 RX ADMIN — CEFTRIAXONE 2000 MG: 2 INJECTION, POWDER, FOR SOLUTION INTRAMUSCULAR; INTRAVENOUS at 23:49

## 2023-12-24 RX ADMIN — HYDROCODONE BITARTRATE AND ACETAMINOPHEN 1 TABLET: 5; 325 TABLET ORAL at 21:15

## 2023-12-24 RX ADMIN — OXYCODONE AND ACETAMINOPHEN 2 TABLET: 5; 325 TABLET ORAL at 14:27

## 2023-12-24 RX ADMIN — GUAIFENESIN 600 MG: 600 TABLET, EXTENDED RELEASE ORAL at 08:26

## 2023-12-24 RX ADMIN — OXYCODONE AND ACETAMINOPHEN 2 TABLET: 5; 325 TABLET ORAL at 23:49

## 2023-12-24 RX ADMIN — Medication 10 ML: at 21:16

## 2023-12-24 RX ADMIN — GUAIFENESIN 600 MG: 600 TABLET, EXTENDED RELEASE ORAL at 21:15

## 2023-12-24 RX ADMIN — HEPARIN SODIUM 5000 UNITS: 5000 INJECTION INTRAVENOUS; SUBCUTANEOUS at 14:27

## 2023-12-24 RX ADMIN — OXYCODONE AND ACETAMINOPHEN 2 TABLET: 5; 325 TABLET ORAL at 18:39

## 2023-12-24 RX ADMIN — OXYCODONE AND ACETAMINOPHEN 2 TABLET: 5; 325 TABLET ORAL at 09:22

## 2023-12-24 RX ADMIN — Medication 10 ML: at 08:26

## 2023-12-24 NOTE — PLAN OF CARE
Goal Outcome Evaluation:                      Patient alert and oriented with constant c/o neck pain. Patient requesting pain medication about every two hours today only administered when available. Patient on room air most of the day. Patient showered. No acute distress noted, will continue to monitor.

## 2023-12-24 NOTE — PLAN OF CARE
Problem: Adult Inpatient Plan of Care  Goal: Plan of Care Review  Outcome: Ongoing, Progressing  Flowsheets (Taken 12/24/2023 0433)  Outcome Evaluation: Pt is on 2LNC, ad linda, pain managed with medications. Pt is in bed at lowest position, brakes on, 2 side rails up, call light within reach.  Goal: Patient-Specific Goal (Individualized)  Outcome: Ongoing, Progressing  Goal: Absence of Hospital-Acquired Illness or Injury  Outcome: Ongoing, Progressing  Intervention: Identify and Manage Fall Risk  Recent Flowsheet Documentation  Taken 12/24/2023 0400 by Jack Desai RN  Safety Promotion/Fall Prevention: safety round/check completed  Taken 12/24/2023 0200 by Jack Desai RN  Safety Promotion/Fall Prevention: safety round/check completed  Taken 12/24/2023 0000 by Jack Desai RN  Safety Promotion/Fall Prevention: clutter free environment maintained  Taken 12/23/2023 2200 by Jack Desai RN  Safety Promotion/Fall Prevention: safety round/check completed  Taken 12/23/2023 2000 by Jack Desai RN  Safety Promotion/Fall Prevention: safety round/check completed  Intervention: Prevent Skin Injury  Recent Flowsheet Documentation  Taken 12/24/2023 0400 by Jack Desai RN  Body Position: position changed independently  Taken 12/24/2023 0200 by Jack Desai RN  Body Position: position changed independently  Taken 12/24/2023 0000 by Jack Desai RN  Body Position: position changed independently  Taken 12/23/2023 2200 by Jack Desai RN  Body Position: position changed independently  Taken 12/23/2023 2000 by Jack Desai RN  Body Position: position changed independently  Skin Protection:   tubing/devices free from skin contact   adhesive use limited   skin-to-skin areas padded  Intervention: Prevent and Manage VTE (Venous Thromboembolism) Risk  Recent Flowsheet Documentation  Taken 12/24/2023 0400 by Jack Desai RN  Activity Management: up ad linda  Taken 12/24/2023 0200 by Jack Desai RN  Activity  Management: up ad linda  Taken 12/24/2023 0000 by Jack Desai RN  Activity Management: up ad linda  Taken 12/23/2023 2200 by Jack Desai RN  Activity Management: up ad linda  Taken 12/23/2023 2000 by Jack Desai RN  Activity Management: ambulated in room  Range of Motion: (neck limited) other (see comments)  Intervention: Prevent Infection  Recent Flowsheet Documentation  Taken 12/23/2023 2200 by Jack Desai RN  Infection Prevention: single patient room provided  Taken 12/23/2023 2000 by Jack Desai RN  Infection Prevention: single patient room provided  Goal: Optimal Comfort and Wellbeing  Outcome: Ongoing, Progressing  Intervention: Provide Person-Centered Care  Recent Flowsheet Documentation  Taken 12/24/2023 0000 by Jack Desai RN  Trust Relationship/Rapport:   care explained   choices provided  Taken 12/23/2023 2000 by Jack Desai RN  Trust Relationship/Rapport:   care explained   choices provided  Goal: Readiness for Transition of Care  Outcome: Ongoing, Progressing     Problem: Fluid Imbalance (Pneumonia)  Goal: Fluid Balance  Outcome: Ongoing, Progressing     Problem: Infection (Pneumonia)  Goal: Resolution of Infection Signs and Symptoms  Outcome: Ongoing, Progressing     Problem: Respiratory Compromise (Pneumonia)  Goal: Effective Oxygenation and Ventilation  Outcome: Ongoing, Progressing  Intervention: Promote Airway Secretion Clearance  Recent Flowsheet Documentation  Taken 12/23/2023 2000 by Jack Desai RN  Cough And Deep Breathing: done independently per patient  Intervention: Optimize Oxygenation and Ventilation  Recent Flowsheet Documentation  Taken 12/24/2023 0400 by Jack Desai RN  Head of Bed (HOB) Positioning: HOB elevated  Taken 12/24/2023 0200 by Jack Desai RN  Head of Bed (HOB) Positioning: HOB elevated  Taken 12/24/2023 0000 by Jack Desai RN  Head of Bed (HOB) Positioning: HOB elevated  Taken 12/23/2023 2200 by Jack Desai RN  Head of Bed (HOB) Positioning: HOB  elevated  Taken 12/23/2023 2000 by Jack Desai RN  Head of Bed (HOB) Positioning: HOB elevated     Problem: Infection  Goal: Absence of Infection Signs and Symptoms  Outcome: Ongoing, Progressing     Problem: Pain Acute  Goal: Acceptable Pain Control and Functional Ability  Outcome: Ongoing, Progressing  Intervention: Prevent or Manage Pain  Recent Flowsheet Documentation  Taken 12/23/2023 2200 by Jack Desai RN  Medication Review/Management: medications reviewed  Taken 12/23/2023 2000 by Jack Desai RN  Medication Review/Management: medications reviewed  Intervention: Optimize Psychosocial Wellbeing  Recent Flowsheet Documentation  Taken 12/23/2023 2000 by Jack Desai RN  Supportive Measures: active listening utilized     Problem: Adjustment to Illness (Sepsis/Septic Shock)  Goal: Optimal Coping  Outcome: Ongoing, Progressing  Intervention: Optimize Psychosocial Adjustment to Illness  Recent Flowsheet Documentation  Taken 12/23/2023 2000 by Jack Desai RN  Supportive Measures: active listening utilized     Problem: Bleeding (Sepsis/Septic Shock)  Goal: Absence of Bleeding  Outcome: Ongoing, Progressing     Problem: Glycemic Control Impaired (Sepsis/Septic Shock)  Goal: Blood Glucose Level Within Desired Range  Outcome: Ongoing, Progressing     Problem: Infection Progression (Sepsis/Septic Shock)  Goal: Absence of Infection Signs and Symptoms  Outcome: Ongoing, Progressing  Intervention: Initiate Sepsis Management  Recent Flowsheet Documentation  Taken 12/23/2023 2200 by Jack Desai RN  Infection Prevention: single patient room provided  Taken 12/23/2023 2000 by Jack Desai RN  Infection Prevention: single patient room provided  Intervention: Promote Recovery  Recent Flowsheet Documentation  Taken 12/24/2023 0400 by Jack Desai RN  Activity Management: up ad linda  Taken 12/24/2023 0200 by Jack Desai RN  Activity Management: up ad linda  Taken 12/24/2023 0000 by Jack Desai RN  Activity  Management: up ad linda  Taken 12/23/2023 2200 by Jack Desai RN  Activity Management: up ad linda  Taken 12/23/2023 2000 by Jack Desai RN  Activity Management: ambulated in room     Problem: Nutrition Impaired (Sepsis/Septic Shock)  Goal: Optimal Nutrition Intake  Outcome: Ongoing, Progressing     Problem: Restraint, Nonviolent  Goal: Absence of Harm or Injury  Outcome: Ongoing, Progressing  Intervention: Implement Least Restrictive Safety Strategies  Recent Flowsheet Documentation  Taken 12/24/2023 0400 by Jack Desai RN  Medical Device Protection: tubing secured  Taken 12/24/2023 0200 by Jack Desai RN  Medical Device Protection: tubing secured  Taken 12/23/2023 2200 by Jack Desai RN  Medical Device Protection: tubing secured  Taken 12/23/2023 2000 by Jack Desai RN  Medical Device Protection: tubing secured  Intervention: Protect Dignity, Rights, and Personal Wellbeing  Recent Flowsheet Documentation  Taken 12/24/2023 0000 by Jack Desai RN  Trust Relationship/Rapport:   care explained   choices provided  Taken 12/23/2023 2000 by Jack Desai RN  Trust Relationship/Rapport:   care explained   choices provided  Intervention: Protect Skin and Joint Integrity  Recent Flowsheet Documentation  Taken 12/24/2023 0400 by Jack Desai RN  Body Position: position changed independently  Taken 12/24/2023 0200 by Jack Desai RN  Body Position: position changed independently  Taken 12/24/2023 0000 by Jack Desai RN  Body Position: position changed independently  Taken 12/23/2023 2200 by Jack Desai RN  Body Position: position changed independently  Taken 12/23/2023 2000 by Jack Desai RN  Body Position: position changed independently  Range of Motion: (neck limited) other (see comments)   Goal Outcome Evaluation:              Outcome Evaluation: Pt is on 2LNC, ad linda, pain managed with medications. Pt is in bed at lowest position, brakes on, 2 side rails up, call light within reach.

## 2023-12-24 NOTE — PROGRESS NOTES
[facility]    [phone]   LOS: 5 days     Name: Markos Virk  Age/Sex: 62 y.o. male  :  1960        PCP: Provider, No Known  Chief Complaint   Patient presents with    Neck Pain     Pt went to [facility], has pnemonia and severe pain in his neck. Pt states that his pain level is 10/10.      Subjective   He still feels pretty rough he says still having a lot of headaches and a lot of neck pain.  General: No Fever or Chills, Cardiac: No Chest Pain or Palpitations, Resp: No Cough or SOA, GI: No Nausea, Vomiting, or Diarrhea, and Other: No bleeding    cefTRIAXone, 2,000 mg, Intravenous, Q24H  [START ON 2023] cefTRIAXone, 2,000 mg, Intravenous, Q24H  fluticasone, 2 spray, Each Nare, Daily  guaiFENesin, 600 mg, Oral, Q12H  heparin (porcine), 5,000 Units, Subcutaneous, Q8H  senna-docusate sodium, 2 tablet, Oral, BID  sodium chloride, 10 mL, Intravenous, Q12H             Objective   Vital Signs  Temp:  [98.2 °F (36.8 °C)-99.9 °F (37.7 °C)] 98.5 °F (36.9 °C)  Heart Rate:  [100-107] 100  Resp:  [16-18] 16  BP: (129-161)/(82-96) 129/83  Body mass index is 23.26 kg/m².    Intake/Output Summary (Last 24 hours) at 2023 1434  Last data filed at 2023 1427  Gross per 24 hour   Intake 1040 ml   Output 2000 ml   Net -960 ml       Physical Exam  Vitals and nursing note reviewed.   Constitutional:       General: He is not in acute distress.     Appearance: He is ill-appearing.   Cardiovascular:      Rate and Rhythm: Normal rate and regular rhythm.   Pulmonary:      Effort: Pulmonary effort is normal. No respiratory distress.   Neurological:      General: No focal deficit present.      Mental Status: He is alert. Mental status is at baseline.           Results Review:       I reviewed the patient's new clinical results.  Results from last 7 days   Lab Units 23  0524 23  0424 23  0455 23  0657 23  0558 23  0457 23  2356   WBC 10*3/mm3 15.48* 16.48* 17.66*  19.68* 25.39* 19.98* 20.96*   HEMOGLOBIN g/dL 11.6* 10.8* 10.6* 11.5* 12.1* 12.0* 12.1*   PLATELETS 10*3/mm3 638* 631* 495* 483* 511* 398 375     Results from last 7 days   Lab Units 12/24/23  0524 12/23/23  0424 12/22/23  0455 12/21/23  0657 12/20/23  0558 12/19/23  0457 12/18/23  2356   SODIUM mmol/L 129* 137 136 132* 133* 136 135*   POTASSIUM mmol/L 3.9 4.1 3.2* 3.1* 3.4* 3.6 3.5   CHLORIDE mmol/L 92* 100 99 97* 98 100 99   CO2 mmol/L 27.6 24.0 25.8 23.8 22.6 22.2 22.0   BUN mg/dL 9 10 10 11 17 14 16   CREATININE mg/dL 0.66* 0.77 0.63* 0.61* 0.83 0.73* 0.92   CALCIUM mg/dL 8.9 8.4* 8.1* 8.5* 8.7 7.9* 8.9   MAGNESIUM mg/dL 1.7  --   --   --   --   --   --    PHOSPHORUS mg/dL 3.7  --   --   --   --   --   --    Estimated Creatinine Clearance: 116.8 mL/min (A) (by C-G formula based on SCr of 0.66 mg/dL (L)).      Assessment & Plan   Active Hospital Problems    Diagnosis  POA    **Pneumonia [J18.9]  Yes    Anemia [D64.9]  Unknown    Thrombocytosis [D75.839]  Unknown    Bacteremia [R78.81]  Unknown    Sepsis [A41.9]  Unknown    Neck pain [M54.2]  Unknown    Leukocytosis [D72.829]  Unknown    Pneumonia due to infectious organism [J18.9]  Unknown      Resolved Hospital Problems    Diagnosis Date Resolved POA    Hypokalemia [E87.6] 12/23/2023 Unknown       PLAN  This is a 62-year-old gentleman with a history of prior tobacco use, an otherwise healthy who presents to the hospital with cough congestion and neck pain and is found to have pneumonia with sepsis  -Pneumococcal pneumonia with bacteremia present on admission.  Responding well to antibiotics but white count has been a little sluggish to normalize.  Continue IV antibiotics for now.  Appreciate ID input.  -Continue supportive care Flonase and guaifenesin ordered today  -Electrolytes are stable today  -Neck pain seemed to be improving likely secondary to musculoskeletal strain from coughing and infection however today his symptoms seem to be worse.  Will watch this  overnight I have increased his pain medication just a little bit if it seems worse tomorrow I would plan for possible neurology evaluation for the headaches.  I feel like this is probably mostly related to sepsis syndrome.  -His sodium is also dropped today.  Will check a urine sodium level but given his pneumonia and pain is probably related to SIADH we will follow-up the urine sodium level later once it is collected.  May need to place on fluid restriction  -His anemia and thrombocytosis are likely reactive.  -Repeat blood cultures were negative  -Pharmacologic DVT prophylaxis with subcu heparin  -Full code      Disposition  Expected Discharge Date: 12/26/2023; Expected Discharge Time:        Tutu Taylor MD  [facility]  12/24/23  14:34 EST

## 2023-12-25 PROBLEM — A40.3 SEPSIS DUE TO STREPTOCOCCUS PNEUMONIAE WITHOUT ACUTE ORGAN DYSFUNCTION: Status: ACTIVE | Noted: 2023-12-23

## 2023-12-25 PROBLEM — J13 PNEUMONIA OF LEFT UPPER LOBE DUE TO STREPTOCOCCUS PNEUMONIAE: Status: ACTIVE | Noted: 2023-12-19

## 2023-12-25 LAB
ALBUMIN SERPL-MCNC: 3.3 G/DL (ref 3.5–5.2)
ANION GAP SERPL CALCULATED.3IONS-SCNC: 15 MMOL/L (ref 5–15)
BACTERIA SPEC AEROBE CULT: NORMAL
BACTERIA SPEC AEROBE CULT: NORMAL
BASOPHILS # BLD AUTO: 0.08 10*3/MM3 (ref 0–0.2)
BASOPHILS NFR BLD AUTO: 0.6 % (ref 0–1.5)
BUN SERPL-MCNC: 16 MG/DL (ref 8–23)
BUN/CREAT SERPL: 21.1 (ref 7–25)
CALCIUM SPEC-SCNC: 8.9 MG/DL (ref 8.6–10.5)
CHLORIDE SERPL-SCNC: 91 MMOL/L (ref 98–107)
CO2 SERPL-SCNC: 27 MMOL/L (ref 22–29)
CREAT SERPL-MCNC: 0.76 MG/DL (ref 0.76–1.27)
DEPRECATED RDW RBC AUTO: 36.7 FL (ref 37–54)
EGFRCR SERPLBLD CKD-EPI 2021: 102.9 ML/MIN/1.73
EOSINOPHIL # BLD AUTO: 0.39 10*3/MM3 (ref 0–0.4)
EOSINOPHIL NFR BLD AUTO: 3 % (ref 0.3–6.2)
ERYTHROCYTE [DISTWIDTH] IN BLOOD BY AUTOMATED COUNT: 11.4 % (ref 12.3–15.4)
GLUCOSE SERPL-MCNC: 163 MG/DL (ref 65–99)
HCT VFR BLD AUTO: 35.3 % (ref 37.5–51)
HGB BLD-MCNC: 11.8 G/DL (ref 13–17.7)
IMM GRANULOCYTES # BLD AUTO: 0.19 10*3/MM3 (ref 0–0.05)
IMM GRANULOCYTES NFR BLD AUTO: 1.5 % (ref 0–0.5)
LYMPHOCYTES # BLD AUTO: 1.9 10*3/MM3 (ref 0.7–3.1)
LYMPHOCYTES NFR BLD AUTO: 14.5 % (ref 19.6–45.3)
MAGNESIUM SERPL-MCNC: 1.8 MG/DL (ref 1.6–2.4)
MCH RBC QN AUTO: 30.1 PG (ref 26.6–33)
MCHC RBC AUTO-ENTMCNC: 33.4 G/DL (ref 31.5–35.7)
MCV RBC AUTO: 90.1 FL (ref 79–97)
MONOCYTES # BLD AUTO: 0.85 10*3/MM3 (ref 0.1–0.9)
MONOCYTES NFR BLD AUTO: 6.5 % (ref 5–12)
NEUTROPHILS NFR BLD AUTO: 73.9 % (ref 42.7–76)
NEUTROPHILS NFR BLD AUTO: 9.69 10*3/MM3 (ref 1.7–7)
NRBC BLD AUTO-RTO: 0 /100 WBC (ref 0–0.2)
PHOSPHATE SERPL-MCNC: 4 MG/DL (ref 2.5–4.5)
PLATELET # BLD AUTO: 637 10*3/MM3 (ref 140–450)
PMV BLD AUTO: 8.9 FL (ref 6–12)
POTASSIUM SERPL-SCNC: 4.4 MMOL/L (ref 3.5–5.2)
PROCALCITONIN SERPL-MCNC: 0.44 NG/ML (ref 0–0.25)
RBC # BLD AUTO: 3.92 10*6/MM3 (ref 4.14–5.8)
SODIUM SERPL-SCNC: 133 MMOL/L (ref 136–145)
WBC NRBC COR # BLD AUTO: 13.1 10*3/MM3 (ref 3.4–10.8)

## 2023-12-25 PROCEDURE — 85025 COMPLETE CBC W/AUTO DIFF WBC: CPT | Performed by: HOSPITALIST

## 2023-12-25 PROCEDURE — 25010000002 HEPARIN (PORCINE) PER 1000 UNITS: Performed by: HOSPITALIST

## 2023-12-25 PROCEDURE — 80069 RENAL FUNCTION PANEL: CPT | Performed by: HOSPITALIST

## 2023-12-25 PROCEDURE — 84145 PROCALCITONIN (PCT): CPT | Performed by: HOSPITALIST

## 2023-12-25 PROCEDURE — 83735 ASSAY OF MAGNESIUM: CPT | Performed by: HOSPITALIST

## 2023-12-25 RX ORDER — AMOXICILLIN 250 MG/1
1000 CAPSULE ORAL EVERY 8 HOURS SCHEDULED
Status: DISCONTINUED | OUTPATIENT
Start: 2023-12-25 | End: 2023-12-26 | Stop reason: HOSPADM

## 2023-12-25 RX ORDER — GABAPENTIN 100 MG/1
100 CAPSULE ORAL 3 TIMES DAILY
Status: DISCONTINUED | OUTPATIENT
Start: 2023-12-25 | End: 2023-12-26 | Stop reason: HOSPADM

## 2023-12-25 RX ORDER — BACLOFEN 10 MG/1
10 TABLET ORAL 3 TIMES DAILY
Status: DISCONTINUED | OUTPATIENT
Start: 2023-12-25 | End: 2023-12-26 | Stop reason: HOSPADM

## 2023-12-25 RX ORDER — OXYCODONE AND ACETAMINOPHEN 10; 325 MG/1; MG/1
1 TABLET ORAL EVERY 4 HOURS PRN
Status: DISCONTINUED | OUTPATIENT
Start: 2023-12-25 | End: 2023-12-26 | Stop reason: HOSPADM

## 2023-12-25 RX ADMIN — FLUTICASONE PROPIONATE 2 SPRAY: 50 SPRAY, METERED NASAL at 08:34

## 2023-12-25 RX ADMIN — BACLOFEN 10 MG: 10 TABLET ORAL at 17:48

## 2023-12-25 RX ADMIN — GUAIFENESIN 600 MG: 600 TABLET, EXTENDED RELEASE ORAL at 21:06

## 2023-12-25 RX ADMIN — OXYCODONE AND ACETAMINOPHEN 2 TABLET: 5; 325 TABLET ORAL at 03:35

## 2023-12-25 RX ADMIN — HEPARIN SODIUM 5000 UNITS: 5000 INJECTION INTRAVENOUS; SUBCUTANEOUS at 21:06

## 2023-12-25 RX ADMIN — GABAPENTIN 100 MG: 100 CAPSULE ORAL at 21:06

## 2023-12-25 RX ADMIN — HEPARIN SODIUM 5000 UNITS: 5000 INJECTION INTRAVENOUS; SUBCUTANEOUS at 08:33

## 2023-12-25 RX ADMIN — OXYCODONE AND ACETAMINOPHEN 2 TABLET: 5; 325 TABLET ORAL at 08:37

## 2023-12-25 RX ADMIN — SENNOSIDES AND DOCUSATE SODIUM 2 TABLET: 50; 8.6 TABLET ORAL at 08:33

## 2023-12-25 RX ADMIN — HEPARIN SODIUM 5000 UNITS: 5000 INJECTION INTRAVENOUS; SUBCUTANEOUS at 15:50

## 2023-12-25 RX ADMIN — CYCLOBENZAPRINE 10 MG: 10 TABLET, FILM COATED ORAL at 11:28

## 2023-12-25 RX ADMIN — GABAPENTIN 100 MG: 100 CAPSULE ORAL at 17:48

## 2023-12-25 RX ADMIN — BACLOFEN 10 MG: 10 TABLET ORAL at 21:06

## 2023-12-25 RX ADMIN — GUAIFENESIN 600 MG: 600 TABLET, EXTENDED RELEASE ORAL at 08:33

## 2023-12-25 RX ADMIN — OXYCODONE AND ACETAMINOPHEN 2 TABLET: 5; 325 TABLET ORAL at 16:16

## 2023-12-25 RX ADMIN — Medication 10 ML: at 21:07

## 2023-12-25 RX ADMIN — OXYCODONE AND ACETAMINOPHEN 1 TABLET: 10; 325 TABLET ORAL at 21:06

## 2023-12-25 RX ADMIN — OXYCODONE AND ACETAMINOPHEN 2 TABLET: 5; 325 TABLET ORAL at 12:10

## 2023-12-25 NOTE — PLAN OF CARE
Problem: Adult Inpatient Plan of Care  Goal: Plan of Care Review  Outcome: Ongoing, Progressing  Flowsheets (Taken 12/25/2023 1701)  Progress: improving  Plan of Care Reviewed With: patient  Outcome Evaluation: Sats WNL on RA. Medicated w/ percocet and flexeril prn for neck pain with min/mod relief - meds adjusted - baclofen and gabapentin added.  Goal: Patient-Specific Goal (Individualized)  Outcome: Ongoing, Progressing  Goal: Absence of Hospital-Acquired Illness or Injury  Outcome: Ongoing, Progressing  Intervention: Identify and Manage Fall Risk  Recent Flowsheet Documentation  Taken 12/25/2023 1620 by Izzy Khan RN  Safety Promotion/Fall Prevention:   fall prevention program maintained   safety round/check completed  Taken 12/25/2023 1422 by Izzy Khan RN  Safety Promotion/Fall Prevention:   fall prevention program maintained   safety round/check completed  Taken 12/25/2023 1200 by Izzy Khan RN  Safety Promotion/Fall Prevention:   fall prevention program maintained   safety round/check completed  Taken 12/25/2023 1030 by Izzy Khan RN  Safety Promotion/Fall Prevention:   fall prevention program maintained   safety round/check completed  Taken 12/25/2023 0840 by Izzy Khan RN  Safety Promotion/Fall Prevention:   fall prevention program maintained   safety round/check completed  Goal: Optimal Comfort and Wellbeing  Outcome: Ongoing, Progressing  Intervention: Monitor Pain and Promote Comfort  Recent Flowsheet Documentation  Taken 12/25/2023 1620 by Izzy Khan RN  Pain Management Interventions: see MAR  Taken 12/25/2023 0840 by Izzy Khan RN  Pain Management Interventions: see MAR  Goal: Readiness for Transition of Care  Outcome: Ongoing, Progressing     Problem: Fluid Imbalance (Pneumonia)  Goal: Fluid Balance  Outcome: Ongoing, Progressing     Problem: Infection (Pneumonia)  Goal: Resolution of Infection Signs and Symptoms  Outcome: Ongoing,  Progressing     Problem: Respiratory Compromise (Pneumonia)  Goal: Effective Oxygenation and Ventilation  Outcome: Ongoing, Progressing     Problem: Infection  Goal: Absence of Infection Signs and Symptoms  Outcome: Ongoing, Progressing     Problem: Pain Acute  Goal: Acceptable Pain Control and Functional Ability  Outcome: Ongoing, Progressing  Intervention: Develop Pain Management Plan  Recent Flowsheet Documentation  Taken 12/25/2023 1620 by Izzy Khan RN  Pain Management Interventions: see MAR  Taken 12/25/2023 0840 by Izzy Khan RN  Pain Management Interventions: see MAR     Problem: Adjustment to Illness (Sepsis/Septic Shock)  Goal: Optimal Coping  Outcome: Ongoing, Progressing     Problem: Bleeding (Sepsis/Septic Shock)  Goal: Absence of Bleeding  Outcome: Ongoing, Progressing     Problem: Glycemic Control Impaired (Sepsis/Septic Shock)  Goal: Blood Glucose Level Within Desired Range  Outcome: Ongoing, Progressing     Problem: Infection Progression (Sepsis/Septic Shock)  Goal: Absence of Infection Signs and Symptoms  Outcome: Ongoing, Progressing     Problem: Nutrition Impaired (Sepsis/Septic Shock)  Goal: Optimal Nutrition Intake  Outcome: Ongoing, Progressing     Problem: Restraint, Nonviolent  Goal: Absence of Harm or Injury  Outcome: Ongoing, Progressing   Goal Outcome Evaluation:  Plan of Care Reviewed With: patient        Progress: improving  Outcome Evaluation: Sats WNL on RA. Medicated w/ percocet and flexeril prn for neck pain with min/mod relief - meds adjusted - baclofen and gabapentin added.

## 2023-12-25 NOTE — PLAN OF CARE
Goal Outcome Evaluation:      Pt up ad linda in room and walking around the nurses station w/o difficulty or incident. Medicated for pain per MAR. Pt anxious/agitated at times No other c/o voiced. VSS No s/s of distress

## 2023-12-25 NOTE — PROGRESS NOTES
Name: Markos Virk ADMIT: 2023   : 1960  PCP: Provider, No Known    MRN: [id number] LOS: 6 days   AGE/SEX: 62 y.o. male  ROOM: [facility]/     Subjective   Subjective   Slight cough but otherwise feels better in regards to his pneumonia.  Primarily complains of ongoing neck pain that is unrelenting.  Scribes it as muscle spasm type pain but is getting muscle relaxants or pain medication.     Objective   Objective   Temp:  [97.3 °F (36.3 °C)-98.5 °F (36.9 °C)] 97.3 °F (36.3 °C)  Heart Rate:  [] 97  Resp:  [18] 18  BP: (109-136)/(80-91) 109/80  SpO2:  [92 %-97 %] 97 %  on   ;   Device (Oxygen Therapy): room air  Body mass index is 23.26 kg/m².    Physical Exam  Vitals and nursing note reviewed.   Constitutional:       General: He is not in acute distress.  Cardiovascular:      Rate and Rhythm: Normal rate and regular rhythm.   Pulmonary:      Effort: Pulmonary effort is normal.      Breath sounds: Normal breath sounds.   Abdominal:      General: Bowel sounds are normal.      Palpations: Abdomen is soft.      Tenderness: There is no abdominal tenderness.   Musculoskeletal:         General: No swelling.   Skin:     General: Skin is warm and dry.   Neurological:      Mental Status: He is alert. Mental status is at baseline.         Results Review      I reviewed the patient's new clinical results.  Results from last 7 days   Lab Units 23  0455   WBC 10*3/mm3 13.10* 15.48* 16.48* 17.66*   HEMOGLOBIN g/dL 11.8* 11.6* 10.8* 10.6*   PLATELETS 10*3/mm3 637* 638* 631* 495*     Results from last 7 days   Lab Units 23  0455   SODIUM mmol/L 133* 129* 137 136   POTASSIUM mmol/L 4.4 3.9 4.1 3.2*   CHLORIDE mmol/L 91* 92* 100 99   CO2 mmol/L 27.0 27.6 24.0 25.8   BUN mg/dL 16 9 10 10   CREATININE mg/dL 0.76 0.66* 0.77 0.63*   GLUCOSE mg/dL 163* 153* 133* 124*   EGFR mL/min/1.73 102.9 107.4 102.5 108.9     Results  from last 7 days   Lab Units 12/25/23  0423 12/24/23  0524 12/23/23  0424 12/22/23  0455 12/19/23  0457 12/18/23  2356   CALCIUM mg/dL 8.9 8.9 8.4* 8.1*   < > 8.9   ALBUMIN g/dL 3.3* 3.0*  --   --   --  3.7   MAGNESIUM mg/dL 1.8 1.7  --   --   --   --    PHOSPHORUS mg/dL 4.0 3.7  --   --   --   --     < > = values in this interval not displayed.     Results from last 7 days   Lab Units 12/25/23  0423 12/19/23  0457 12/18/23  2356   PROCALCITONIN ng/mL 0.44*  --  5.45*   LACTATE mmol/L  --  1.2 1.1     Lab Results   Component Value Date    STREPPNEUAG Positive (A) 12/19/2023    LEGANTIGENUR Negative 12/19/2023     Results from last 7 days   Lab Units 12/20/23  0601 12/20/23  0558 12/19/23  0945   BLOODCX  No growth at 5 days   < >  --    RESPCX   --   --  Scant growth (1+) Normal respiratory kyle. No S. aureus or Pseudomonas aeruginosa detected. Final report.    < > = values in this interval not displayed.         I have personally reviewed all medications:  Scheduled Medications  amoxicillin, 1,000 mg, Oral, Q8H  baclofen, 10 mg, Oral, TID  fluticasone, 2 spray, Each Nare, Daily  gabapentin, 100 mg, Oral, TID  guaiFENesin, 600 mg, Oral, Q12H  heparin (porcine), 5,000 Units, Subcutaneous, Q8H  senna-docusate sodium, 2 tablet, Oral, BID  sodium chloride, 10 mL, Intravenous, Q12H    Infusions   Diet  Diet: Cardiac Diets; Healthy Heart (2-3 Na+); Texture: Regular Texture (IDDSI 7); Fluid Consistency: Thin (IDDSI 0)    I have personally reviewed:  [x]  Laboratory   [x]  Microbiology   [x]  Radiology   [x]  EKG/Telemetry  [x]  Cardiology/Vascular   []  Pathology    []  Records         Assessment/Plan     Active Hospital Problems    Diagnosis  POA    **Pneumonia of left upper lobe due to Streptococcus pneumoniae [J13]  Yes    Anemia [D64.9]  Yes    Thrombocytosis [D75.839]  Yes    Bacteremia [R78.81]  Yes    Sepsis due to Streptococcus pneumoniae without acute organ dysfunction [A40.3]  Yes    Neck pain [M54.2]  Yes       Resolved Hospital Problems    Diagnosis Date Resolved POA    Hypokalemia [E87.6] 12/23/2023 Yes       62-year-old gentleman with a history of prior tobacco use, an otherwise healthy who presents to the hospital with cough congestion and neck pain and is found to have pneumonia with sepsis.    He seems stable in regards to his pneumonia and bacteremia.  He is afebrile and WBC trending down.  PCT much improved.  As per ID he was cleared to transition to oral antibiotic.  I do not think additional IV antibiotics or get help with his neck pain.  Neck pain was evaluated by neurosurgery.  MRI of his c-spine was obtained with no signs of injury, abscess, etc.  It was felt most likely to be musculoskeletal and recommended conservative nonoperative management.  He continues to have severe pain.  Location of pain is more near his occiput then further down into his neck.  Will try adding gabapentin and changing Flexeril to baclofen.  Continue Percocet.  If no improvement will get neurology and/or pain management to see tomorrow to consider other treatments.      Heparin SC for DVT prophylaxis.  Full code.  Discussed with patient.  Anticipate discharge home in 1-2 days.      Getachew Suarez MD  [facility]  12/25/23  16:19 EST

## 2023-12-26 ENCOUNTER — READMISSION MANAGEMENT (OUTPATIENT)
Dept: CALL CENTER | Facility: HOSPITAL | Age: 63
End: 2023-12-26
Payer: MEDICAID

## 2023-12-26 VITALS
DIASTOLIC BLOOD PRESSURE: 68 MMHG | BODY MASS INDEX: 23.19 KG/M2 | WEIGHT: 153 LBS | RESPIRATION RATE: 18 BRPM | TEMPERATURE: 98.6 F | HEIGHT: 68 IN | OXYGEN SATURATION: 97 % | HEART RATE: 82 BPM | SYSTOLIC BLOOD PRESSURE: 126 MMHG

## 2023-12-26 PROBLEM — M54.81 OCCIPITAL NEURALGIA: Status: ACTIVE | Noted: 2023-12-19

## 2023-12-26 LAB
ANION GAP SERPL CALCULATED.3IONS-SCNC: 11.5 MMOL/L (ref 5–15)
BUN SERPL-MCNC: 15 MG/DL (ref 8–23)
BUN/CREAT SERPL: 20.3 (ref 7–25)
CALCIUM SPEC-SCNC: 9.1 MG/DL (ref 8.6–10.5)
CHLORIDE SERPL-SCNC: 92 MMOL/L (ref 98–107)
CO2 SERPL-SCNC: 27.5 MMOL/L (ref 22–29)
CREAT SERPL-MCNC: 0.74 MG/DL (ref 0.76–1.27)
DEPRECATED RDW RBC AUTO: 35.7 FL (ref 37–54)
EGFRCR SERPLBLD CKD-EPI 2021: 103.7 ML/MIN/1.73
ERYTHROCYTE [DISTWIDTH] IN BLOOD BY AUTOMATED COUNT: 11.3 % (ref 12.3–15.4)
GLUCOSE SERPL-MCNC: 124 MG/DL (ref 65–99)
HCT VFR BLD AUTO: 38 % (ref 37.5–51)
HGB BLD-MCNC: 12 G/DL (ref 13–17.7)
MCH RBC QN AUTO: 28 PG (ref 26.6–33)
MCHC RBC AUTO-ENTMCNC: 31.6 G/DL (ref 31.5–35.7)
MCV RBC AUTO: 88.6 FL (ref 79–97)
PLATELET # BLD AUTO: 758 10*3/MM3 (ref 140–450)
PMV BLD AUTO: 8.5 FL (ref 6–12)
POTASSIUM SERPL-SCNC: 4.5 MMOL/L (ref 3.5–5.2)
RBC # BLD AUTO: 4.29 10*6/MM3 (ref 4.14–5.8)
SODIUM SERPL-SCNC: 131 MMOL/L (ref 136–145)
WBC NRBC COR # BLD AUTO: 13.2 10*3/MM3 (ref 3.4–10.8)

## 2023-12-26 PROCEDURE — 80048 BASIC METABOLIC PNL TOTAL CA: CPT | Performed by: HOSPITALIST

## 2023-12-26 PROCEDURE — 85027 COMPLETE CBC AUTOMATED: CPT | Performed by: HOSPITALIST

## 2023-12-26 PROCEDURE — 99232 SBSQ HOSP IP/OBS MODERATE 35: CPT | Performed by: INTERNAL MEDICINE

## 2023-12-26 RX ORDER — POLYETHYLENE GLYCOL 3350 17 G/17G
17 POWDER, FOR SOLUTION ORAL DAILY PRN
Start: 2023-12-26

## 2023-12-26 RX ORDER — METHYLPREDNISOLONE 4 MG/1
TABLET ORAL
Qty: 21 TABLET | Refills: 0 | Status: SHIPPED | OUTPATIENT
Start: 2023-12-26

## 2023-12-26 RX ORDER — AMOXICILLIN 500 MG/1
1000 CAPSULE ORAL EVERY 8 HOURS SCHEDULED
Qty: 26 CAPSULE | Refills: 0 | Status: SHIPPED | OUTPATIENT
Start: 2023-12-26 | End: 2023-12-31

## 2023-12-26 RX ORDER — OXYCODONE AND ACETAMINOPHEN 10; 325 MG/1; MG/1
1 TABLET ORAL EVERY 4 HOURS PRN
Qty: 20 TABLET | Refills: 0 | Status: SHIPPED | OUTPATIENT
Start: 2023-12-26 | End: 2024-01-01

## 2023-12-26 RX ORDER — NALOXONE HYDROCHLORIDE 4 MG/.1ML
SPRAY NASAL
Qty: 2 EACH | Refills: 0 | Status: SHIPPED | OUTPATIENT
Start: 2023-12-26

## 2023-12-26 RX ORDER — BACLOFEN 10 MG/1
10 TABLET ORAL 3 TIMES DAILY
Qty: 21 TABLET | Refills: 0 | Status: SHIPPED | OUTPATIENT
Start: 2023-12-26

## 2023-12-26 RX ORDER — GABAPENTIN 100 MG/1
100 CAPSULE ORAL 3 TIMES DAILY
Qty: 21 CAPSULE | Refills: 0 | Status: SHIPPED | OUTPATIENT
Start: 2023-12-26

## 2023-12-26 RX ADMIN — BACLOFEN 10 MG: 10 TABLET ORAL at 08:12

## 2023-12-26 RX ADMIN — GABAPENTIN 100 MG: 100 CAPSULE ORAL at 08:12

## 2023-12-26 RX ADMIN — GUAIFENESIN 600 MG: 600 TABLET, EXTENDED RELEASE ORAL at 08:12

## 2023-12-26 RX ADMIN — OXYCODONE AND ACETAMINOPHEN 1 TABLET: 10; 325 TABLET ORAL at 00:49

## 2023-12-26 RX ADMIN — AMOXICILLIN 1000 MG: 250 CAPSULE ORAL at 08:12

## 2023-12-26 RX ADMIN — FLUTICASONE PROPIONATE 2 SPRAY: 50 SPRAY, METERED NASAL at 08:13

## 2023-12-26 RX ADMIN — SENNOSIDES AND DOCUSATE SODIUM 2 TABLET: 50; 8.6 TABLET ORAL at 08:13

## 2023-12-26 RX ADMIN — OXYCODONE AND ACETAMINOPHEN 1 TABLET: 10; 325 TABLET ORAL at 07:32

## 2023-12-26 RX ADMIN — AMOXICILLIN 1000 MG: 250 CAPSULE ORAL at 00:50

## 2023-12-26 RX ADMIN — OXYCODONE AND ACETAMINOPHEN 1 TABLET: 10; 325 TABLET ORAL at 11:44

## 2023-12-26 NOTE — CASE MANAGEMENT/SOCIAL WORK
Case Management Discharge Note      Final Note: dc home    Provided Post Acute Provider List?: N/A  Provided Post Acute Provider Quality & Resource List?: N/A    Selected Continued Care - Discharged on 12/26/2023 Admission date: 12/18/2023 - Discharge disposition: Home or Self Care      Destination    No services have been selected for the patient.                Durable Medical Equipment    No services have been selected for the patient.                Dialysis/Infusion    No services have been selected for the patient.                Home Medical Care    No services have been selected for the patient.                Therapy    No services have been selected for the patient.                Community Resources    No services have been selected for the patient.                Community & DME    No services have been selected for the patient.                    Transportation Services  Private: Car    Final Discharge Disposition Code: 01 - home or self-care

## 2023-12-26 NOTE — DISCHARGE SUMMARY
Patient Name: Markos Virk  : 1960  MRN: [id number]    Date of Admission: 2023  Date of Discharge:  2023  Primary Care Physician: Provider, No Known      Chief Complaint:   Neck Pain (Pt went to [facility], has pnemonia and severe pain in his neck. Pt states that his pain level is 10/10.)      Discharge Diagnoses     Active Hospital Problems    Diagnosis  POA    **Pneumonia of left upper lobe due to Streptococcus pneumoniae [J13]  Yes    Anemia [D64.9]  Yes    Thrombocytosis [D75.839]  Yes    Bacteremia [R78.81]  Yes    Sepsis due to Streptococcus pneumoniae without acute organ dysfunction [A40.3]  Yes    Occipital neuralgia [M54.81]  Yes      Resolved Hospital Problems    Diagnosis Date Resolved POA    Hypokalemia [E87.6] 2023 Yes        Hospital Course     Mr. Virk is a 62 y.o. male who presented to [facility] initially complaining of neck pain and cough.  Please see the admitting history and physical for further details.  He was found to have pneumonia and was admitted to the hospital for further evaluation and treatment.  He was started on empiric antibiotics and cultures including blood cultures came back positive for pneumococcus.  He was seen by infectious disease and prescribed a full course of antibiotics.  Repeat blood cultures negative.  Plan to discharge home to complete antibiotics with ampicillin.    In regards to his neck pain he was seen by neurosurgery.  No concerning findings on exam or in his history.  He has been somewhat resistant to treatment here still having severe pain.  Pain seems to be located primarily just below his occiput and seems consistent with occipital neuralgia he is better today with addition of gabapentin.  Discussed with neurology and unfortunately nobody available for occipital injection.  Plan to send home today on his current medications along with a Medrol Dosepak and follow-up in the neurology office next week if symptoms persist.   Patient knows to call and cancel appointment if symptoms resolved.      Day of Discharge     Subjective:  Overall feels better today.  Neck pain slightly improved with adjustment in medications made yesterday.  Agreeable to discharge today.    Physical Exam:  Temp:  [97.9 °F (36.6 °C)-98.6 °F (37 °C)] 98.6 °F (37 °C)  Heart Rate:  [] 82  Resp:  [18] 18  BP: (122-135)/(68-84) 126/68  Body mass index is 23.26 kg/m².  Physical Exam  Vitals and nursing note reviewed.   Constitutional:       General: He is not in acute distress.  Neck:      Comments: Very tender just below the occiput  Cardiovascular:      Rate and Rhythm: Normal rate and regular rhythm.   Pulmonary:      Effort: Pulmonary effort is normal.      Breath sounds: Normal breath sounds.   Abdominal:      General: Bowel sounds are normal.      Palpations: Abdomen is soft.      Tenderness: There is no abdominal tenderness.   Musculoskeletal:         General: No swelling.   Skin:     General: Skin is warm and dry.   Neurological:      Mental Status: He is alert. Mental status is at baseline.         Consultants     Consult Orders (all) (From admission, onward)       Start     Ordered    12/20/23 1152  Inpatient Neurosurgery Consult  Once        Specialty:  Neurosurgery  Provider:  Tyree Car MD    12/20/23 1152    12/19/23 1343  Inpatient Infectious Diseases Consult  Once        Specialty:  Infectious Diseases  Provider:  Guanaco Breen MD    12/19/23 1342             Procedures     Imaging Results (All)       Procedure Component Value Units Date/Time    CT Chest Without Contrast Diagnostic [[id number]] Collected: 12/21/23 1649     Updated: 12/21/23 1658    Narrative:      CT CHEST WITHOUT CONTRAST     HISTORY: Pneumonia     TECHNIQUE: Radiation dose reduction techniques were utilized, including  automated exposure control and exposure modulation based on body size.  Axial images were obtained through the chest without the  administration  of IV contrast. Coronal and sagittal reformatted images obtained.     COMPARISON: No prior chest CTs. Correlation made with chest x-ray  12/18/2023     FINDINGS: There is dense consolidation in the posterior left upper lobe  and lingula consistent with pneumonia. There is a trace left pleural  effusion. There is mild atelectasis in both lower lobes, left greater  than right. Mildly prominent mediastinal lymph nodes are probably  reactive given the findings in the lungs. Cardiomegaly. The visualized  upper abdominal structures appear unremarkable. No acute bony  abnormality       Impression:      Dense consolidation within the left upper lobe consistent with  pneumonia. Trace left pleural effusion. Follow-up to clearing is  recommended           Radiation dose reduction techniques were utilized, including automated  exposure control and exposure modulation based on body size.        This report was finalized on 12/21/2023 4:55 PM by Dr. Matthew Nair M.D on Workstation: [id number]       MRI Cervical Spine With & Without Contrast [[id number]] Collected: 12/19/23 2118     Updated: 12/21/23 1518    Narrative:      MRI EXAMINATION CERVICAL SPINE WITH AND WITHOUT CONTRAST     HISTORY: Neck pain, stiffness.     COMPARISON: CT cervical spine 12/19/2023.     FINDINGS: The study is limited by patient motion. The sagittal T2  sequence demonstrates a thin plane of increased signal intensity in the  prevertebral soft tissues extending from the inferior aspect of C2 to  the inferior aspect of C4, nonspecific.     Signal intensity within the cervical cord is normal on the axial and  sagittal T2 sequences. Fluid is present within the mastoid air cells on  the right.     There is reversal of the normal cervical lordosis with the apex at the  level of C4. There is a grade 1 retrolisthesis of C4 upon C5 estimated  to be approximately 3 mm. Three millimeters retrolisthesis of C5 upon C6  and C6 upon C7 is appreciated.  There is approximately 3 to 4 mm of  anterolisthesis of C7 upon T1.     C2-3: Moderate facet degenerative disease is present on the left.     C3-4: There is mild canal stenosis secondary to a broad-based disc  osteophyte complex. Mild to moderate facet degenerative disease is  present on the left and right respectively. Moderate foraminal stenosis  is present on the right secondary to uncovertebral degenerative disease  and facet hypertrophy.     C4-5: There is moderate canal stenosis secondary to a broad-based disc  osteophyte complex and the retrolisthesis of C4 upon C5. This abuts and  flattens the cord moderately centrally, slightly more so to the right of  midline. Moderate foraminal stenosis is present on the left secondary to  facet and uncovertebral degenerative disease.     C5-6: There is moderate canal stenosis secondary to a broad-based disc  osteophyte complex which is more prominent to the left. It abuts and  mildly flattens the cord centrally and to the left. Cerebrospinal fluid  is effaced ventral and dorsal to the cord. The spinal stenosis is  accentuated by the retrolisthesis of C5 upon C6. Moderate and moderate  to severe facet degenerative disease is present on the left and right  respectively. Severe foraminal stenosis is present bilaterally secondary  to loss of disc height, uncovertebral degenerative disease and extension  of disc-osteophyte complex into the neural foramen.     C6-7: There is mild canal stenosis secondary to a central broad-based  disc osteophyte complex which is more prominent to the left and  accentuated by the retrolisthesis of C6 upon C7. Moderate foraminal  stenosis is present bilaterally secondary to loss of disc height and  uncovertebral degenerative disease.     C7-T1: There is no evidence of disc bulge or herniation.     After contrast administration there was mild enhancement of the  prevertebral soft tissues extending from the inferior aspect of C2-C4.  This  corresponds to the area of T2 hyperintensity noted on the sagittal  fat-suppressed T2 sequence. The appearance is nonspecific. This may  represent edema/inflammation. There is no evidence of marrow edema or  enhancement involving the discs or vertebral bodies. Clinical  correlation suggested..       Impression:      1.  IMPRESSION: The study is degraded by patient motion. There is no  evidence of cord signal abnormality or a focal herniation. Multilevel  degenerative disease involving the cervical spine is noted as described  above including multilevel loss of disc height, retrolisthesis of C4  upon C5, C5 upon C6, C6 upon C7 and anterolisthesis of C7 upon T1 and  broad-based disc osteophyte complexes and foraminal stenosis. Canal  stenosis is most prominent at C4-5 and C5-6 where there is moderate  canal stenosis and moderate flattening of the cord at each level.  Cerebrospinal fluid is effaced ventral and dorsal to the cord at each  level. At C4-5 the disc osteophyte complex is more prominent to the  right. At C5-6 the disc osteophyte complex is slightly more prominent to  the left. See above.  2.  There is a thin plane of T2 hyperintensity anterior to the cervical  spine extending from the inferior aspect of C2 through the inferior  aspect of C4, nonspecific but suggesting edema/inflammation. This area  enhances after contrast administration.      This report was finalized on 12/21/2023 3:14 PM by Dr. Tutu Fisher M.D on Workstation: [id number]       XR Foreign Body For MRI [[id number]] Collected: 12/19/23 2017     Updated: 12/19/23 2028    Narrative:      X-RAY FOREIGN BODY FOR MRI.     HISTORY: Evaluate for foreign body.     FINDINGS: 2 views of the left elbow and a single view of the left wrist  show no evidence of a radiopaque foreign body. Severe degenerative  disease is noted at the base of the first metacarpal. Multiple carpal  fusion is noted in including fusion of the lunate, capitate, hamate and  likely  triquetrum. Clinical correlation is recommended. Further  evaluation could be performed with a dedicated plain film examination of  the wrist.     This report was finalized on 12/19/2023 8:25 PM by Dr. Tutu Fisher M.D on Workstation: [id number]       CT Cervical Spine Without Contrast [[id number]] Collected: 12/19/23 0108     Updated: 12/19/23 0108    Narrative:        Patient: JORGE BENTLEY  Time Out: 01:07  Exam(s): CT C SPINE     EXAM:    CT Cervical Spine Without Intravenous Contrast    CLINICAL HISTORY:     Reason for exam: neck pain.    TECHNIQUE:    Axial computed tomography images of the cervical spine without   intravenous contrast.  This CT exam was performed according to the   principle of ALARA (As Low As Reasonably Achievable) by using one or more   of the following dose reduction techniques: automated exposure control,   adjustment of the mA and or kV according to patient size, and or use of   iterative reconstruction technique.    COMPARISON:    No relevant prior studies available.    FINDINGS:   The vertebral body heights are maintained. The craniocervical junction is   intact. The atlanto-dens interval is maintained. The dens is intact.    Grade 1 anterolisthesis of C3 on C4, measures 3 mm.    Multilevel cervical spondylosis and degenerative disc disease.  Reversal   of the cervical lordosis.     The unenhanced neck soft tissues are grossly unremarkable.  The   visualized lung apices are grossly clear.    IMPRESSION:  No cervical spine fracture.    Grade 1 anterolisthesis of C3 on C4, measures 3 mm.      Impression:          Electronically signed by Johnathon Shelton MD on 12-19-23 at 0107    CT Head Without Contrast [[id number]] Collected: 12/19/23 0106     Updated: 12/19/23 0106    Narrative:        Patient: JORGE BENTLEY  Time Out: 01:05  Exam(s): CT HEAD Without Contrast     EXAM:    CT Head Without Intravenous Contrast    CLINICAL HISTORY:     Reason for exam: ha.    TECHNIQUE:    Axial computed  tomography images of the head brain without intravenous   contrast.  This CT exam was performed according to the principle of ALARA   (As Low As Reasonably Achievable) by using one or more of the following   dose reduction techniques: automated exposure control, adjustment of the   mA and or kV according to patient size, and or use of iterative   reconstruction technique.    COMPARISON:    No relevant prior studies available.    FINDINGS:  No acute intracranial hemorrhage.  No midline shift or mass effect.     The territorial gray-white matter differentiation is maintained   throughout.     Age-related cerebral volume loss.  Periventricular and subcortical white   matter hypoattenuation, consistent with chronic microangiopathy.     The visualized orbits appear grossly unremarkable.    The calvarium is intact.    The visualized paranasal sinuses and mastoid air cells are grossly clear.    IMPRESSION:  No acute intracranial hemorrhage, midline shift, or mass effect.      Impression:          Electronically signed by Johnathon Shelton MD on 12-19-23 at 0105    XR Chest 1 View [[id number]] Collected: 12/19/23 0058     Updated: 12/19/23 0058    Narrative:        Patient: JORGE BENTLEY  Time Out: 00:57  Exam(s): XR CXR 1 VIEW     EXAM:    XR Chest, 1 View    CLINICAL HISTORY:     Reason for exam: cough, fever.    TECHNIQUE:    Frontal view of the chest.    COMPARISON:    No relevant prior studies available.    FINDINGS:    Lungs:  Patchy consolidation in the left mid and lower lung field,   concerning for pneumonia.    Pleural space:  Unremarkable.  No pneumothorax.    Heart:  Cardiomegaly.    Mediastinum:  Unremarkable.  Normal mediastinal contour.    Bones joints:  Right shoulder ORIF.  No acute fracture.    IMPRESSION:         Patchy consolidation in the left mid and lower lung field, concerning   for pneumonia.      Impression:          Electronically signed by Johnathon Shelton MD on 12-19-23 at 0057            Results for orders  "placed during the hospital encounter of 12/18/23    Adult Transthoracic Echo Complete W/ Cont if Necessary Per Protocol    Interpretation Summary    Left ventricular systolic function is normal. Calculated left ventricular EF = 52.5%    Left ventricular diastolic function was normal.    Pertinent Labs     Results from last 7 days   Lab Units 12/26/23  0717 12/25/23  0423 12/24/23  0524 12/23/23  0424   WBC 10*3/mm3 13.20* 13.10* 15.48* 16.48*   HEMOGLOBIN g/dL 12.0* 11.8* 11.6* 10.8*   PLATELETS 10*3/mm3 758* 637* 638* 631*     Results from last 7 days   Lab Units 12/26/23  0717 12/25/23  0423 12/24/23  0524 12/23/23  0424   SODIUM mmol/L 131* 133* 129* 137   POTASSIUM mmol/L 4.5 4.4 3.9 4.1   CHLORIDE mmol/L 92* 91* 92* 100   CO2 mmol/L 27.5 27.0 27.6 24.0   BUN mg/dL 15 16 9 10   CREATININE mg/dL 0.74* 0.76 0.66* 0.77   GLUCOSE mg/dL 124* 163* 153* 133*   EGFR mL/min/1.73 103.7 102.9 107.4 102.5     Results from last 7 days   Lab Units 12/25/23  0423 12/24/23  0524   ALBUMIN g/dL 3.3* 3.0*     Results from last 7 days   Lab Units 12/26/23  0717 12/25/23  0423 12/24/23  0524 12/23/23  0424   CALCIUM mg/dL 9.1 8.9 8.9 8.4*   ALBUMIN g/dL  --  3.3* 3.0*  --    MAGNESIUM mg/dL  --  1.8 1.7  --    PHOSPHORUS mg/dL  --  4.0 3.7  --          Results from last 7 days   Lab Units 12/24/23  0925   SODIUM UR mmol/L 133   OSMOLALITY UR mOsm/kg 397         Invalid input(s): \"LDLCALC\"  Results from last 7 days   Lab Units 12/20/23  0601 12/20/23  0558   BLOODCX  No growth at 5 days No growth at 5 days         Test Results Pending at Discharge       Discharge Details        Discharge Medications        New Medications        Instructions Start Date   amoxicillin 500 MG capsule  Commonly known as: AMOXIL   1,000 mg, Oral, Every 8 Hours Scheduled      baclofen 10 MG tablet  Commonly known as: LIORESAL   10 mg, Oral, 3 Times Daily      gabapentin 100 MG capsule  Commonly known as: NEURONTIN   100 mg, Oral, 3 Times Daily    "   methylPREDNISolone 4 MG dose pack  Commonly known as: MEDROL   Take as directed on package instructions.      naloxone 4 MG/0.1ML nasal spray  Commonly known as: NARCAN   Call 911. Don't prime. [place] in 1 nostril for overdose. Repeat in 2-3 minutes in other nostril if no or minimal breathing/responsiveness.      oxyCODONE-acetaminophen  MG per tablet  Commonly known as: PERCOCET   1 tablet, Oral, Every 4 Hours PRN      polyethylene glycol 17 g packet  Commonly known as: MIRALAX   17 g, Oral, Daily PRN               No Known Allergies    Discharge Disposition:  Home or Self Care      Discharge Diet:  No active diet order      Discharge Activity:   Activity Instructions       Activity as Tolerated              CODE STATUS:    Code Status and Medical Interventions:   Ordered at: 12/19/23 0424     Code Status (Patient has no pulse and is not breathing):    CPR (Attempt to Resuscitate)     Medical Interventions (Patient has pulse or is breathing):    Full Support       No future appointments.  Additional Instructions for the Follow-ups that You Need to Schedule       Discharge Follow-up with PCP   As directed       Currently Documented PCP:    Provider, No Known    PCP Phone Number:    [phone]     Follow Up Details: 1 to 2 weeks (or sooner if problems)               Follow-up Information       Provider, No Known .    Why: 1 to 2 weeks (or sooner if problems)  Contact information:  [place] [id number] [phone]               Ana Brothers, YAJAIRA .    Specialties: Neurology, Nurse Practitioner, Emergency Medicine  Contact information:  [address] [place] [address]  [phone]               Provider, No Known .    Contact information:  [place] [id number] [phone]                             Additional Instructions for the Follow-ups that You Need to Schedule       Discharge Follow-up with PCP   As directed       Currently Documented PCP:     Provider, No Known    PCP Phone Number:    [phone]     Follow Up Details: 1 to 2 weeks (or sooner if problems)            Time Spent on Discharge:  Greater than 30 minutes      Getachew Suarez MD  [facility]  12/26/23  11:07 EST

## 2023-12-26 NOTE — PROGRESS NOTES
ID note for sepsis  S: Feels okay apart from ongoing neck pain  AF  On RA  Plan dc today    Physical Exam:   Vital Signs   Temp:  [97.9 °F (36.6 °C)-98.6 °F (37 °C)] 98.6 °F (37 °C)  Heart Rate:  [] 82  Resp:  [18] 18  BP: (109-135)/(68-84) 126/68    GENERAL: Awake and alert, ill-appearing  HEENT: Oropharynx is clear. Hearing is grossly normal.   EYES: . No conjunctival injection. No lid lag.   LUNGS:normal respiratory effort.   SKIN: no cutaneous eruptions in exposed areas  PSYCHIATRIC: .  Cooperative today.    Results Review:  White count 13  PCT 0.4  Creatinine 0.7    Microbiology:  12/18 Bcx 2/2 pneumococcus  12/19 RPP neg  12/20 blood cultures no growth t     A/p  1.  Streptococcal pneumoniae septicemia secondary #2  2.  Left lower lobe pneumococcal pneumonia  3.  Encephalopathy, question methamphetamine withdrawal    Plan dc on amoxicillin 1 g p.o. every 8 hours, stop date 12/30/2023.  D/w Dr Suarez and jonathan for short course of steroids  D/w pt that he will need to f/u with outpatient providers for repeat cxr

## 2023-12-26 NOTE — DISCHARGE INSTR - APPOINTMENTS
Follow up with Dr. Suarez next week.  Please call to schedule appointment.   [facility]  [address] [place] [address]  [phone]

## 2023-12-27 NOTE — OUTREACH NOTE
Prep Survey      Flowsheet Row Responses   [facility] facility patient discharged from? [facility]   Is LACE score < 7 ? No   Eligibility Readm Mgmt   Discharge diagnosis PNA   Does the patient have one of the following disease processes/diagnoses(primary or secondary)? Pneumonia   Does the patient have Home health ordered? No   Is there a DME ordered? No   Prep survey completed? Yes            JULIO OZUNA - Registered Nurse

## 2023-12-29 ENCOUNTER — TELEPHONE (OUTPATIENT)
Dept: NEUROLOGY | Facility: CLINIC | Age: 63
End: 2023-12-29
Payer: MEDICAID

## 2023-12-29 NOTE — TELEPHONE ENCOUNTER
----- Message from YAJAIRA Conde sent at 12/29/2023  9:55 AM EST -----  Sure, thanks  ----- Message -----  From: Carolyn Faustin RN  Sent: 12/29/2023   9:28 AM EST  To: YAJAIRA Conde    Would you be ok with doing an ONB for this patient?  You have an opening on Friday.    Thank you!    Carolyn  ----- Message -----  From: Samina Lyon APRN  Sent: 12/26/2023  12:38 PM EST  To: Carolyn Faustin RN    Can you try to get this patient scheduled for an occipital nerve block with someone next week?  The patient was admitted to the hospital this week with sepsis and the primary felt he had occipital neuralgia associated with this.  They are giving them a Medrol Dosepak and discharging him because would not have anyone that can do an occipital nerve block in the hospital right now.  Thanks.

## 2024-01-03 ENCOUNTER — TELEPHONE (OUTPATIENT)
Dept: NEUROLOGY | Facility: CLINIC | Age: 64
End: 2024-01-03
Payer: MEDICAID

## 2024-01-04 ENCOUNTER — TELEPHONE (OUTPATIENT)
Dept: NEUROLOGY | Facility: CLINIC | Age: 64
End: 2024-01-04
Payer: MEDICAID

## 2024-01-04 ENCOUNTER — READMISSION MANAGEMENT (OUTPATIENT)
Dept: CALL CENTER | Facility: HOSPITAL | Age: 64
End: 2024-01-04
Payer: MEDICAID

## 2024-01-04 NOTE — OUTREACH NOTE
COPD/PN Week 1 Survey      Flowsheet Row Responses   [facility] facility patient discharged from? [facility]   Does the patient have one of the following disease processes/diagnoses(primary or secondary)? Pneumonia   Week 1 attempt successful? No   Unsuccessful attempts Attempt 1            Johanna ALCANTAR - Registered Nurse

## 2024-01-04 NOTE — TELEPHONE ENCOUNTER
Per Kay Gaston, APRN:    Carolyn, can you reach out to this patient and explain the following since you've had contact with him from the start? Let him know I am happy to see him in office for this if he is still experiencing it but it would be 30 days before we could appeal to his insurance company to cover an ONB. He could always pay out of pocket if he is unwilling to wait a month for the re-authorization.                  Estimate department stated that they were unable to give an estimate since the patient has Medicaid.      Called patient to discuss.  Left message to call back.

## 2024-01-08 ENCOUNTER — READMISSION MANAGEMENT (OUTPATIENT)
Dept: CALL CENTER | Facility: HOSPITAL | Age: 64
End: 2024-01-08
Payer: MEDICAID

## 2024-01-08 DIAGNOSIS — M54.81 OCCIPITAL NEURALGIA, UNSPECIFIED LATERALITY: ICD-10-CM

## 2024-01-08 RX ORDER — BACLOFEN 10 MG/1
10 TABLET ORAL 3 TIMES DAILY
Qty: 21 TABLET | Refills: 0 | OUTPATIENT
Start: 2024-01-08

## 2024-01-08 RX ORDER — AMOXICILLIN 500 MG/1
1000 CAPSULE ORAL EVERY 8 HOURS SCHEDULED
Qty: 26 CAPSULE | Refills: 0 | OUTPATIENT
Start: 2024-01-08 | End: 2024-01-13

## 2024-01-08 RX ORDER — GABAPENTIN 100 MG/1
100 CAPSULE ORAL 3 TIMES DAILY
Qty: 21 CAPSULE | Refills: 0 | OUTPATIENT
Start: 2024-01-08

## 2024-01-08 NOTE — OUTREACH NOTE
"COPD/PN Week 1 Survey      Flowsheet Row Responses   [facility] patient discharged from? [place]   Does the patient have one of the following disease processes/diagnoses(primary or secondary)? Pneumonia   Week 1 attempt successful? Yes   Call start time 1304   Unsuccessful attempts Attempt 2   Call end time 1305   Discharge diagnosis PNA   Is patient permission given to speak with other caregiver? Yes   List who call center can speak with Mehul   Person spoke with today (if not patient) and relationship Mehul   What is the patient's perception of their health status since discharge? Improving   Week 1 call completed? Yes   Wrap up additional comments Brief call-unable to reach pt-Mehul states pt was at his birthday party a couple of days ago and \"seemed to be doing fine\"   Call end time 1305            Yeny ZABALA - Registered Nurse  "

## 2024-01-29 ENCOUNTER — OFFICE VISIT (OUTPATIENT)
Dept: INTERNAL MEDICINE | Age: 64
End: 2024-01-29
Payer: MEDICAID

## 2024-01-29 ENCOUNTER — LAB (OUTPATIENT)
Facility: HOSPITAL | Age: 64
End: 2024-01-29
Payer: MEDICAID

## 2024-01-29 ENCOUNTER — HOSPITAL ENCOUNTER (OUTPATIENT)
Facility: HOSPITAL | Age: 64
Discharge: HOME | End: 2024-01-29
Payer: MEDICAID

## 2024-01-29 VITALS
DIASTOLIC BLOOD PRESSURE: 84 MMHG | SYSTOLIC BLOOD PRESSURE: 140 MMHG | HEART RATE: 110 BPM | TEMPERATURE: 97.6 F | HEIGHT: 68 IN | WEIGHT: 144 LBS | BODY MASS INDEX: 21.82 KG/M2 | OXYGEN SATURATION: 98 %

## 2024-01-29 DIAGNOSIS — Z76.89 ENCOUNTER TO ESTABLISH CARE: Primary | ICD-10-CM

## 2024-01-29 DIAGNOSIS — Z87.891 HISTORY OF SMOKING: ICD-10-CM

## 2024-01-29 DIAGNOSIS — Z87.01 HISTORY OF PNEUMONIA: ICD-10-CM

## 2024-01-29 DIAGNOSIS — Z11.59 NEED FOR HEPATITIS C SCREENING TEST: ICD-10-CM

## 2024-01-29 DIAGNOSIS — Z12.11 COLON CANCER SCREENING: ICD-10-CM

## 2024-01-29 DIAGNOSIS — M54.2 POSTERIOR NECK PAIN: ICD-10-CM

## 2024-01-29 DIAGNOSIS — M54.81 BILATERAL OCCIPITAL NEURALGIA: ICD-10-CM

## 2024-01-29 LAB
ANION GAP SERPL CALCULATED.3IONS-SCNC: 11.9 MMOL/L (ref 5–15)
BASOPHILS # BLD AUTO: 0.09 10*3/MM3 (ref 0–0.2)
BASOPHILS NFR BLD AUTO: 1.1 % (ref 0–1.5)
BUN SERPL-MCNC: 18 MG/DL (ref 8–23)
BUN/CREAT SERPL: 23.1 (ref 7–25)
CALCIUM SPEC-SCNC: 10.1 MG/DL (ref 8.6–10.5)
CHLORIDE SERPL-SCNC: 99 MMOL/L (ref 98–107)
CO2 SERPL-SCNC: 25.1 MMOL/L (ref 22–29)
CREAT SERPL-MCNC: 0.78 MG/DL (ref 0.76–1.27)
DEPRECATED RDW RBC AUTO: 38 FL (ref 37–54)
EGFRCR SERPLBLD CKD-EPI 2021: 101.5 ML/MIN/1.73
EOSINOPHIL # BLD AUTO: 0.18 10*3/MM3 (ref 0–0.4)
EOSINOPHIL NFR BLD AUTO: 2.3 % (ref 0.3–6.2)
ERYTHROCYTE [DISTWIDTH] IN BLOOD BY AUTOMATED COUNT: 12.3 % (ref 12.3–15.4)
GLUCOSE SERPL-MCNC: 108 MG/DL (ref 65–99)
HCT VFR BLD AUTO: 40.7 % (ref 37.5–51)
HCV AB SER DONR QL: NORMAL
HGB BLD-MCNC: 13.7 G/DL (ref 13–17.7)
IMM GRANULOCYTES # BLD AUTO: 0.02 10*3/MM3 (ref 0–0.05)
IMM GRANULOCYTES NFR BLD AUTO: 0.3 % (ref 0–0.5)
LYMPHOCYTES # BLD AUTO: 2.84 10*3/MM3 (ref 0.7–3.1)
LYMPHOCYTES NFR BLD AUTO: 35.9 % (ref 19.6–45.3)
MCH RBC QN AUTO: 28.9 PG (ref 26.6–33)
MCHC RBC AUTO-ENTMCNC: 33.7 G/DL (ref 31.5–35.7)
MCV RBC AUTO: 85.9 FL (ref 79–97)
MONOCYTES # BLD AUTO: 0.45 10*3/MM3 (ref 0.1–0.9)
MONOCYTES NFR BLD AUTO: 5.7 % (ref 5–12)
NEUTROPHILS NFR BLD AUTO: 4.32 10*3/MM3 (ref 1.7–7)
NEUTROPHILS NFR BLD AUTO: 54.7 % (ref 42.7–76)
NRBC BLD AUTO-RTO: 0 /100 WBC (ref 0–0.2)
PLATELET # BLD AUTO: 553 10*3/MM3 (ref 140–450)
PMV BLD AUTO: 9.3 FL (ref 6–12)
POTASSIUM SERPL-SCNC: 4.7 MMOL/L (ref 3.5–5.2)
RBC # BLD AUTO: 4.74 10*6/MM3 (ref 4.14–5.8)
SODIUM SERPL-SCNC: 136 MMOL/L (ref 136–145)
WBC NRBC COR # BLD AUTO: 7.9 10*3/MM3 (ref 3.4–10.8)

## 2024-01-29 PROCEDURE — 85025 COMPLETE CBC W/AUTO DIFF WBC: CPT

## 2024-01-29 PROCEDURE — 71046 X-RAY EXAM CHEST 2 VIEWS: CPT

## 2024-01-29 PROCEDURE — 80048 BASIC METABOLIC PNL TOTAL CA: CPT

## 2024-01-29 PROCEDURE — 36415 COLL VENOUS BLD VENIPUNCTURE: CPT

## 2024-01-29 PROCEDURE — 86803 HEPATITIS C AB TEST: CPT

## 2024-01-29 RX ORDER — BACLOFEN 10 MG/1
10 TABLET ORAL 3 TIMES DAILY PRN
Qty: 30 TABLET | Refills: 0 | Status: SHIPPED | OUTPATIENT
Start: 2024-01-29

## 2024-01-29 NOTE — PROGRESS NOTES
"    I N T E R N A L  M E D I C I N E  Jenn Oliva, APRN    ENCOUNTER DATE:  01/29/2024    Markos HANSON Emrah / 63 y.o. / male      CHIEF COMPLAINT / REASON FOR OFFICE VISIT     Establish Care and Neck Pain      ASSESSMENT & PLAN     Diagnoses and all orders for this visit:    1. Encounter to establish care (Primary)    2. History of pneumonia  -     XR Chest 2 View; Future  -     CBC & Differential  -     Basic metabolic panel    3. Posterior neck pain  -     Ambulatory Referral to Physical Therapy Evaluate and treat    4. Bilateral occipital neuralgia  -     Ambulatory Referral to Neurology  -     baclofen (LIORESAL) 10 MG tablet; Take 1 tablet by mouth 3 (Three) Times a Day As Needed for Muscle Spasms.  Dispense: 30 tablet; Refill: 0    5. History of smoking  -     CT Chest Low Dose Wo; Future    6. Colon cancer screening  -     Ambulatory Referral For Screening Colonoscopy    7. Need for hepatitis C screening test  -     Hepatitis C Antibody         SUMMARY/DISCUSSION  We will recheck blood work toward to assess for return of WBC to normal range following his recent treatment for pneumonia.  Update Chest XR to verify improving pneumonia.  Clinically, his symptoms have resolved.    For his posterior neck pain, I discussed importance for patient to schedule with neurology to undergo injections to help with symptoms.  He acknowledge understanding.  Recommend muscle relaxer as needed and he will establish with physical therapy.  Aware to visit the ER for any neurological symptoms.  Encouraged to work towards smoking cessation.  He is agreeable to update CT Chest.  Agreeable to update colon cancer screening with colonoscopy.      Next Appointment with me: Visit date not found    Return in about 3 months (around 4/29/2024) for Annual physical.      VITAL SIGNS     Visit Vitals  /84   Pulse 110   Temp 97.6 °F (36.4 °C)   Ht 172.7 cm (67.99\")   Wt 65.3 kg (144 lb)   SpO2 98%   BMI 21.90 kg/m²             Wt Readings " from Last 3 Encounters:   01/29/24 65.3 kg (144 lb)   12/20/23 69.4 kg (153 lb)     Body mass index is 21.9 kg/m².        MEDICATIONS AT THE TIME OF OFFICE VISIT     Current Outpatient Medications on File Prior to Visit   Medication Sig Dispense Refill    naloxone (NARCAN) 4 MG/0.1ML nasal spray Call 911. Don't prime. [place] in 1 nostril for overdose. Repeat in 2-3 minutes in other nostril if no or minimal breathing/responsiveness. 2 each 0     No current facility-administered medications on file prior to visit.        HISTORY OF PRESENT ILLNESS     Admitted to [facility] from December 18 - 26, 2023 for pneumonia.  December 21, 2023 CT Chest showed dense consolidation within the left upper lobe consistent with pneumonia, trace left pleural effusion.  Blood cultures were positive for pneumococcus.  Infectious disease prescribed full course of antibiotics and repeat blood cultures were negative.  Discharged home to complete antibiotics with amoxicillin.  He denies fever, chills, chest pain, chest tightness, dyspnea.  Still with mild productive cough, and is taking Mucinex OTC.      During his recent hospitalization, neurosurgery evaluated his neck pain, thought to be consistent with occipital neuralgia.  Improved with course of gabapentin.  December 19, 2023 MRI Cervical spine showed multilevel degenerative disease with canal stenosis most prominent at C4-5 and C5-6.  Discharged with medrol dose pack and follow up in neurology office to consider occipital injection if symptoms persist.  He was a no show for his neurology appointment on January 5, 2024.  He has now completed baclofen 10 mg TID, gabapentin 100 mg TID, Percocet 10 mg every 4 hours PRN.  Reports persistent symptoms of posterior neck pain with right> left.  Uses acetaminophen and ibuprofen at home with benefit.  Occasional episodes of bilateral finger numbness, primarily index and middle, but no extremity weakness.    42 year smoking history, 1/2 pack  daily.  Not interested in quitting at this time.    He was seen in ER on July 29, 2022 for opiate overdose.  He reports a prior use history of snorting heroin/ opiates, but no injections.         Patient Care Team:  Jenn Oliva APRN as PCP - General (Family Medicine)    REVIEW OF SYSTEMS     Review of Systems   Constitutional:  Negative for chills, fever and unexpected weight change.   Respiratory:  Negative for cough, chest tightness and shortness of breath.    Cardiovascular:  Negative for chest pain, palpitations and leg swelling.   Musculoskeletal:  Positive for neck pain.   Neurological:  Negative for dizziness, weakness, light-headedness and headaches.   Psychiatric/Behavioral:  The patient is not nervous/anxious.           PHYSICAL EXAMINATION     Physical Exam  Vitals reviewed.   Constitutional:       General: He is not in acute distress.     Appearance: Normal appearance. He is not ill-appearing, toxic-appearing or diaphoretic.   HENT:      Head: Normocephalic and atraumatic.   Cardiovascular:      Rate and Rhythm: Normal rate and regular rhythm.      Heart sounds: Normal heart sounds.   Pulmonary:      Effort: Pulmonary effort is normal.      Breath sounds: Normal breath sounds.   Musculoskeletal:      Cervical back: Tenderness (Right occipital tenderness) present. No bony tenderness.   Neurological:      Mental Status: He is alert and oriented to person, place, and time. Mental status is at baseline.      Sensory: No sensory deficit.      Motor: No weakness.   Psychiatric:         Mood and Affect: Mood normal.         Behavior: Behavior normal.         Thought Content: Thought content normal.         Judgment: Judgment normal.           REVIEWED DATA     Labs:           Imaging:            Medical Tests:           Summary of old records / correspondence / consultant report:           Request outside records:

## 2024-01-30 DIAGNOSIS — R79.89 ELEVATED PLATELET COUNT: Primary | ICD-10-CM

## 2024-03-07 ENCOUNTER — OFFICE VISIT (OUTPATIENT)
Dept: NEUROLOGY | Facility: CLINIC | Age: 64
End: 2024-03-07
Payer: MEDICAID

## 2024-03-07 VITALS
WEIGHT: 156 LBS | HEART RATE: 96 BPM | DIASTOLIC BLOOD PRESSURE: 86 MMHG | BODY MASS INDEX: 23.64 KG/M2 | SYSTOLIC BLOOD PRESSURE: 152 MMHG | RESPIRATION RATE: 20 BRPM | OXYGEN SATURATION: 96 % | HEIGHT: 68 IN

## 2024-03-07 DIAGNOSIS — M25.511 CHRONIC RIGHT SHOULDER PAIN: ICD-10-CM

## 2024-03-07 DIAGNOSIS — G89.29 CHRONIC RIGHT SHOULDER PAIN: ICD-10-CM

## 2024-03-07 DIAGNOSIS — M25.611 DECREASED RANGE OF MOTION OF RIGHT SHOULDER: ICD-10-CM

## 2024-03-07 DIAGNOSIS — R93.7 ABNORMAL MRI, CERVICAL SPINE: ICD-10-CM

## 2024-03-07 DIAGNOSIS — M54.81 BILATERAL OCCIPITAL NEURALGIA: Primary | ICD-10-CM

## 2024-03-07 DIAGNOSIS — R20.0 BILATERAL HAND NUMBNESS: ICD-10-CM

## 2024-03-07 RX ORDER — METHYLPREDNISOLONE 4 MG/1
TABLET ORAL
Qty: 21 TABLET | Refills: 0 | Status: SHIPPED | OUTPATIENT
Start: 2024-03-07

## 2024-03-07 RX ORDER — BACLOFEN 10 MG/1
10 TABLET ORAL
Qty: 30 TABLET | Refills: 0 | Status: SHIPPED | OUTPATIENT
Start: 2024-03-07 | End: 2024-03-07 | Stop reason: SDUPTHER

## 2024-03-07 RX ORDER — BACLOFEN 10 MG/1
10 TABLET ORAL
Qty: 30 TABLET | Refills: 0 | Status: SHIPPED | OUTPATIENT
Start: 2024-03-07

## 2024-03-07 NOTE — PROGRESS NOTES
DOS: 3/7/2024  NAME: Markos Virk   : 1960  PCP: Jenn Oliva APRN    Chief Complaint   Patient presents with    Headache   Patient unaccompanied to today's visit.      Neurological Problem and Interval History:  63 y.o.  male with a known history of prior cataracts (first procedure at age 12) with implanted lenses currently, lumbar discectomy, wrist surgery, tobacco abuse (quit smoking 4 days ago) who I am seeing today in initial consultation for acute headaches which started 2023 in the setting of pneumonia.  Patient reports prior to this he would get an occasional headache that was aborted over-the-counter medications.  He denies any recent falls/head trauma.  Patient reports he received him Medrol Dosepak during hospitalization and headaches improved after this and have since returned.  He quit smoking approximately 4 days ago; prior multiple attempts.  Location of his headache is posterior/-bilateral.  Headaches are associated with neck stiffness/dizziness/photophobia; denies nausea and/or vomiting.  He notes that headaches are improved by use of marijuana/heat at times and dark room.  Headaches are often worsened by light and cold temperatures/ice.  He reports some bilateral hand arm numbness right greater than left with hand numbness/tingling in all digits on the right hand and only the fourth and fifth digit on the left.  Prior MRI cervical spine  showed multilevel degenerative disease of the cervical spine with canal stenosis most prominent at C4-5 and C5-6-moderate canal stenosis and moderate flattening of the cord at each level.  Also within plane of T2 hyperintensity noted C2-C4; nonspecific finding.  Given symptoms/imaging finding will recommend neurosurgery follow-up.  Patient reports he has approximately 3 headaches per week 5 of which are debilitating per month.  He has approximately 3 caffeinated beverages per day-Mountain Dew.  He reports 4 to 8 hours of sleep per night  "this varies.  He notes he wakes up feeling rested and refreshed after sleep does not have daytime sleeping and denies snoring-given chronic tobacco abuse should consider JAD evaluation if headaches or not improving as patient reports headaches often worse in the morning typically 1 hour after arising.  He also reports some grinding of teeth and jaw clenching recommended oral appliance/bite guard-baclofen should also help with this.    Patient with occipital tenderness left slightly greater than right on exam; ideally would prefer to complete occipital nerve block today unfortunately this has been denied by insurance therefore will recommend Medrol dose pack-addition of nightly baclofen in as needed use of Nurtec and I will plan to see patient back in 3 to 6 months.    Current Outpatient Medications:     baclofen (LIORESAL) 10 MG tablet, Take 1 tablet by mouth every night at bedtime., Disp: 30 tablet, Rfl: 0    methylPREDNISolone (MEDROL) 4 MG dose pack, Take as directed on package instructions., Disp: 21 tablet, Rfl: 0    naloxone (NARCAN) 4 MG/0.1ML nasal spray, Call 911. Don't prime. [place] in 1 nostril for overdose. Repeat in 2-3 minutes in other nostril if no or minimal breathing/responsiveness. (Patient not taking: Reported on 3/7/2024), Disp: 2 each, Rfl: 0    \"The following portions of the patient's history were reviewed and updated as appropriate: allergies, current medications, past family history, past medical history, past social history, past surgical history and problem list.\"  Review and Interpretation of Imaging:  Imaging discussed above reviewed independent  Laboratory Results:             Lab Results   Component Value Date    HGBA1C 6.10 (H) 12/24/2023         Physical Examination: NIHSS: 0 mRS: 0  General Appearance:   Well developed, well nourished, well groomed, alert, and cooperative.  HEENT:   Normocephalic.  No temporal tenderness.  Bilateral occipital tenderness.   Neck and Spine:  Normal " range of motion.  Normal alignment. No mass or tenderness. No bruits.  Cardiac: Regular rate and rhythm. No murmurs.  Peripheral Vasculature: Radial and pedal pulses are equal and symmetric.   Extremities:    No edema or deformities. Normal joint ROM.  Skin:    No rashes or birth marks.    Neurological examination:  Higher Integrative  Function:   Oriented to time, place and person. Normal registration, recall, attention span and concentration. Normal language including comprehension, spontaneous speech, repetition, reading, writing, naming and vocabulary. No neglect with normal visual-spatial function and construction. Normal fund of knowledge and higher integrative function.  CN II:    Pupils are equal, round, and reactive to light. Normal visual acuity and visual fields.    CN III IV VI:   Extraocular movements are full without nystagmus.   CN V:    Normal facial sensation and strength of muscles of mastication.  CN VII:   Facial movements are symmetric. No weakness.  CN VIII:   Auditory acuity is normal.  CN IX & X:   Symmetric palatal movement.  CN XI:    Sternocleidomastoid and trapezius are normal.  No weakness.  CN XII:   The tongue is midline.  No atrophy or fasciculations.  Motor:    Normal muscle strength, bulk and tone in upper and lower extremities.    Sensation:   Normal to light touch in arms and legs.   Station and Gait:  Normal gait and station.    Coordination:  Finger to nose test shows no dysmetria.         Diagnoses:    1.  Headache with associated photophobia/dizziness/neck stiffness felt to be secondary to occipital neuralgia versus component of cervicalgia-ideally would complete occipital nerve block unfortunately insurance denied therefore recommend Medrol Dosepak/baclofen nightly and Nurtec as needed  2.  Bilateral hand/finger numbness right greater than left with abnormal MRI cervical spine will recommend follow-up with neurosurgery  3.  Right shoulder pain with limited range of motion;  recommend referral to Ortho    Plan:     Consider JAD evaluation if headaches not improved  Referral to neurosurgery and Ortho  Medrol dose pack as written/baclofen 10 mg nightly; use half tablet x 3 days if tolerated escalate to 10 mg/Nurtec 75 mg daily as needed for acute headache; max total daily dose 75 mg  Blood pressure control to <140/80  Follow-up in 3 to 6 months; sooner if needed    Diagnoses and all orders for this visit:    1. Bilateral occipital neuralgia (Primary)  -     Discontinue: baclofen (LIORESAL) 10 MG tablet; Take 1 tablet by mouth every night at bedtime.  Dispense: 30 tablet; Refill: 0  -     methylPREDNISolone (MEDROL) 4 MG dose pack; Take as directed on package instructions.  Dispense: 21 tablet; Refill: 0  -     baclofen (LIORESAL) 10 MG tablet; Take 1 tablet by mouth every night at bedtime.  Dispense: 30 tablet; Refill: 0    2. Chronic right shoulder pain  -     Ambulatory Referral to Orthopedic Surgery    3. Decreased range of motion of right shoulder  -     Ambulatory Referral to Orthopedic Surgery    4. Abnormal MRI, cervical spine  -     Ambulatory Referral to Neurosurgery    5. Bilateral hand numbness  -     Ambulatory Referral to Neurosurgery

## 2024-03-08 ENCOUNTER — PATIENT ROUNDING (BHMG ONLY) (OUTPATIENT)
Dept: NEUROLOGY | Facility: CLINIC | Age: 64
End: 2024-03-08
Payer: MEDICAID

## 2024-09-03 ENCOUNTER — TELEPHONE (OUTPATIENT)
Dept: NEUROLOGY | Facility: CLINIC | Age: 64
End: 2024-09-03
Payer: MEDICAID

## 2024-09-03 NOTE — TELEPHONE ENCOUNTER
09/03/2024 LM to call and schedule his appointment on 09/11/2024 with cornelio Brothers, or Kay Gaston at the [facility]

## 2024-09-04 ENCOUNTER — TELEPHONE (OUTPATIENT)
Dept: NEUROLOGY | Facility: CLINIC | Age: 64
End: 2024-09-04
Payer: MEDICAID

## 2024-09-04 NOTE — TELEPHONE ENCOUNTER
09/04/2024 LM to call and schedule his appointment on 09/11/2024 with cornelio Brothers, or Kay Gaston at the [facility]

## 2024-09-06 ENCOUNTER — TELEPHONE (OUTPATIENT)
Dept: NEUROLOGY | Facility: CLINIC | Age: 64
End: 2024-09-06
Payer: MEDICAID

## 2024-09-06 NOTE — TELEPHONE ENCOUNTER
09/06/2024 LM to [facility] the office at [phone] ,we can schedule his appt with Ana at the [place] office 09/11/2024 , or if he does not want to come to [place], we can make the appt with Kay Gaston at [facility]

## 2024-09-11 ENCOUNTER — TELEPHONE (OUTPATIENT)
Dept: NEUROLOGY | Facility: CLINIC | Age: 64
End: 2024-09-11

## 2024-09-11 NOTE — TELEPHONE ENCOUNTER
Left voicemail for pt.  Has appt today with Ana that we need to r/s either in [place] of with Kay Marilin.

## 2024-09-11 NOTE — TELEPHONE ENCOUNTER
Left vm. Notifying of appt date and time as well as the change of provider.  Mailing reminder note.